# Patient Record
Sex: FEMALE | Race: WHITE | NOT HISPANIC OR LATINO | Employment: FULL TIME | URBAN - METROPOLITAN AREA
[De-identification: names, ages, dates, MRNs, and addresses within clinical notes are randomized per-mention and may not be internally consistent; named-entity substitution may affect disease eponyms.]

---

## 2017-02-04 ENCOUNTER — OFFICE VISIT (OUTPATIENT)
Dept: URGENT CARE | Facility: URGENT CARE | Age: 47
End: 2017-02-04
Payer: OTHER GOVERNMENT

## 2017-02-04 VITALS
DIASTOLIC BLOOD PRESSURE: 72 MMHG | BODY MASS INDEX: 23 KG/M2 | SYSTOLIC BLOOD PRESSURE: 113 MMHG | HEIGHT: 62 IN | TEMPERATURE: 97.6 F | WEIGHT: 125 LBS | HEART RATE: 91 BPM | OXYGEN SATURATION: 99 %

## 2017-02-04 DIAGNOSIS — R07.0 THROAT PAIN: Primary | ICD-10-CM

## 2017-02-04 DIAGNOSIS — M79.10 MUSCULAR ACHES: ICD-10-CM

## 2017-02-04 LAB
DEPRECATED S PYO AG THROAT QL EIA: NORMAL
FLUAV+FLUBV AG SPEC QL: NORMAL
FLUAV+FLUBV AG SPEC QL: NORMAL
MICRO REPORT STATUS: NORMAL
SPECIMEN SOURCE: NORMAL
SPECIMEN SOURCE: NORMAL

## 2017-02-04 PROCEDURE — 99213 OFFICE O/P EST LOW 20 MIN: CPT | Performed by: PHYSICIAN ASSISTANT

## 2017-02-04 PROCEDURE — 87880 STREP A ASSAY W/OPTIC: CPT | Performed by: FAMILY MEDICINE

## 2017-02-04 PROCEDURE — 87804 INFLUENZA ASSAY W/OPTIC: CPT | Performed by: PHYSICIAN ASSISTANT

## 2017-02-04 PROCEDURE — 87081 CULTURE SCREEN ONLY: CPT | Performed by: FAMILY MEDICINE

## 2017-02-04 NOTE — NURSING NOTE
"Chief Complaint   Patient presents with     Urgent Care     Pharyngitis     c/o sore throat for 2 days       Initial /72 mmHg  Pulse 91  Temp(Src) 97.6  F (36.4  C) (Tympanic)  Ht 5' 2\" (1.575 m)  Wt 125 lb (56.7 kg)  BMI 22.86 kg/m2  SpO2 99%  LMP 01/21/2017  Breastfeeding? No Estimated body mass index is 22.86 kg/(m^2) as calculated from the following:    Height as of this encounter: 5' 2\" (1.575 m).    Weight as of this encounter: 125 lb (56.7 kg).  Medication Reconciliation: complete   Adela Chavarria MA    "

## 2017-02-06 LAB
BACTERIA SPEC CULT: NORMAL
MICRO REPORT STATUS: NORMAL
SPECIMEN SOURCE: NORMAL

## 2017-06-30 ENCOUNTER — MYC REFILL (OUTPATIENT)
Dept: OBGYN | Facility: CLINIC | Age: 47
End: 2017-06-30

## 2017-06-30 DIAGNOSIS — Z01.419 ENCOUNTER FOR GYNECOLOGICAL EXAMINATION WITHOUT ABNORMAL FINDING: ICD-10-CM

## 2017-06-30 RX ORDER — NORGESTIMATE AND ETHINYL ESTRADIOL 0.25-0.035
1 KIT ORAL DAILY
Qty: 112 TABLET | Refills: 0 | Status: SHIPPED | OUTPATIENT
Start: 2017-06-30 | End: 2017-08-08

## 2017-06-30 NOTE — TELEPHONE ENCOUNTER
Pt sent refill request via [facility]. AE is scheduled for 08/08/2017. Refill sent per protocol. Lorrie Casas RN

## 2017-08-08 ENCOUNTER — OFFICE VISIT (OUTPATIENT)
Dept: OBGYN | Facility: CLINIC | Age: 47
End: 2017-08-08
Payer: OTHER GOVERNMENT

## 2017-08-08 VITALS
HEIGHT: 62 IN | TEMPERATURE: 98.3 F | BODY MASS INDEX: 23.55 KG/M2 | DIASTOLIC BLOOD PRESSURE: 60 MMHG | WEIGHT: 128 LBS | SYSTOLIC BLOOD PRESSURE: 102 MMHG

## 2017-08-08 DIAGNOSIS — Z13.29 SCREENING FOR THYROID DISORDER: ICD-10-CM

## 2017-08-08 DIAGNOSIS — Z01.419 ENCOUNTER FOR GYNECOLOGICAL EXAMINATION WITHOUT ABNORMAL FINDING: Primary | ICD-10-CM

## 2017-08-08 LAB — TSH SERPL DL<=0.005 MIU/L-ACNC: 2.64 MU/L (ref 0.4–4)

## 2017-08-08 PROCEDURE — 99396 PREV VISIT EST AGE 40-64: CPT | Performed by: OBSTETRICS & GYNECOLOGY

## 2017-08-08 PROCEDURE — 36415 COLL VENOUS BLD VENIPUNCTURE: CPT | Performed by: OBSTETRICS & GYNECOLOGY

## 2017-08-08 PROCEDURE — 84443 ASSAY THYROID STIM HORMONE: CPT | Performed by: OBSTETRICS & GYNECOLOGY

## 2017-08-08 RX ORDER — NORGESTIMATE AND ETHINYL ESTRADIOL 0.25-0.035
1 KIT ORAL DAILY
Qty: 112 TABLET | Refills: 3 | Status: SHIPPED | OUTPATIENT
Start: 2017-08-08 | End: 2018-08-09

## 2017-08-08 NOTE — NURSING NOTE
"Chief Complaint   Patient presents with     Physical       Initial /60  Temp 98.3  F (36.8  C) (Oral)  Ht 5' 2\" (1.575 m)  Wt 128 lb (58.1 kg)  LMP 2017  BMI 23.41 kg/m2 Estimated body mass index is 23.41 kg/(m^2) as calculated from the following:    Height as of this encounter: 5' 2\" (1.575 m).    Weight as of this encounter: 128 lb (58.1 kg).  BP completed using cuff size: regular        The following HM Due: NONE      The following patient reported/Care Every where data was sent to:  P ABSTRACT QUALITY INITIATIVES [57894]  NONE     patient has appointment for today             "

## 2017-08-08 NOTE — MR AVS SNAPSHOT
"              After Visit Summary   8/8/2017    Maria Teresa Benedict    MRN: [id number]           Patient Information     Date Of Birth          1970        Visit Information        Provider Department      8/8/2017 9:45 AM Laura Philippe MD [facility]        Today's Diagnoses     Encounter for gynecological examination without abnormal finding    -  1    Screening for thyroid disorder           Follow-ups after your visit        Your next 10 appointments already scheduled     Aug 09, 2017  9:45 AM CDT   MA SCREENING DIGITAL BILATERAL with OXMA1   [facility] ([facility]    [address] [address] [phone]           Do not use any powder, lotion or deodorant under your arms or on your breast. If you do, we will ask you to remove it before your exam.  Wear comfortable, two-piece clothing.  If you have any allergies, tell your care team.  Bring any previous mammograms from other facilities or have them mailed to the breast center. Three-dimensional (3D) mammograms are available at [facility] locations in [facility], and [place]. [facility] locations include [place] [facility] in [place].   Benefits of 3D mammograms include: - Improved rate of cancer detection - Decreases your chance of having to go back for more tests, which means fewer: - \"False-positive\" results (This means that there is an abnormal area but it isn't cancer.) - Invasive testing procedures, such as a biopsy or surgery - Can provide clearer images of the breast if you have dense breast tissue. 3D mammography is an optional exam that anyone can have with a 2D mammogram. It doesn't replace or take the place of a 2D mammogram. 2D mammograms remain an effective screening test for all women.  Not all insurance companies cover the cost of a 3D mammogram. Check with your insurance. " "             Who to contact     If you have questions or need follow up information about today's clinic visit or your schedule please contact [facility] directly at [phone].  Normal or non-critical lab and imaging results will be communicated to you by MyChart, letter or phone within 4 business days after the clinic has received the results. If you do not hear from us within 7 days, please contact the clinic through MyChart or phone. If you have a critical or abnormal lab result, we will notify you by phone as soon as possible.  Submit refill requests through [employer] or call your pharmacy and they will forward the refill request to us. Please allow 3 business days for your refill to be completed.          Additional Information About Your Visit        [employer] Information     [employer] gives you secure access to your electronic health record. If you see a primary care provider, you can also send messages to your care team and make appointments. If you have questions, please call your primary care clinic.  If you do not have a primary care provider, please call [phone] and they will assist you.        Care EveryWhere ID     This is your Care EveryWhere ID. This could be used by other organizations to access your [place] medical records  [id number]        Your Vitals Were     Temperature Height Last Period BMI (Body Mass Index)          98.3  F (36.8  C) (Oral) 5' 2\" (1.575 m) 07/30/2017 23.41 kg/m2         Blood Pressure from Last 3 Encounters:   08/08/17 102/60   02/04/17 113/72   07/13/16 98/62    Weight from Last 3 Encounters:   08/08/17 128 lb (58.1 kg)   02/04/17 125 lb (56.7 kg)   07/13/16 127 lb 3.2 oz (57.7 kg)              We Performed the Following     TSH with free T4 reflex          Where to get your medicines      These medications were [facility] to [id number] - [facility] [address]  [address] [facility] [address]     Phone:  [phone]     " norgestimate-ethinyl estradiol 0.25-35 MG-MCG per tablet          Primary Care Provider Office Phone # Fax #    RAMON Ramirez [phone]       [facility] [address] [place] [address]        Equal Access to Services     JAJA CARRILLO : Hadii aad ku hadasho Soomaali, waaxda luqadaha, qaybta kaalmada adeegyada, waxay idiin hayaan adeeg kharash laanya . So [facility] [phone].    ATENCIÓN: Si habla español, tiene a maya disposición servicios gratuitos de asistencia lingüística. Chaka al [phone].    We comply with applicable federal civil rights laws and [place] laws. We do not discriminate on the basis of race, color, national origin, age, disability sex, sexual orientation or gender identity.            Thank you!     Thank you for choosing [facility]  for your care. Our goal is always to provide you with excellent care. Hearing back from our patients is one way we can continue to improve our services. Please take a few minutes to complete the written survey that you may receive in the mail after your visit with us. Thank you!             Your Updated Medication List - Protect others around you: Learn how to safely use, store and throw away your medicines at www.disposemymeds.org.          This list is accurate as of: 8/8/17 10:22 AM.  Always use your most recent med list.                   Brand Name Dispense Instructions for use Diagnosis    MULTIVITAMIN ADULT PO           norgestimate-ethinyl estradiol 0.25-35 MG-MCG per tablet    ORTHO-CYCLEN, SPRINTEC    112 tablet    Take 1 tablet by mouth daily Active tablets daily for prevention of menses    Encounter for gynecological examination without abnormal finding

## 2017-08-08 NOTE — PROGRESS NOTES
Maria Teresa is a 46 year old  female who presents for annual exam.     Menses are regular q 28-30 days and normal lasting 4 days.  Menses flow: normal.  Patient's last menstrual period was 2017.. Using tubal ligation for contraception.  She is not currently considering pregnancy.  Besides routine health maintenance, she has no other health concerns today .  On OCP for cycle control. Still taking monthly withdrawals right now as prefers that to BTB--but still loves the pill.  Sons are now 11 and 7 y/o. Doing well.  Oldest going into 6th grade this year so will need to find a middle school for next year.  GYNECOLOGIC HISTORY:  Menarche:   Maria Teresa is sexually active with 1male partner(s) and is currently in monogamous relationship.    History sexually transmitted infections:No STD history  STI testing offered?  Declined  LAURA exposure: Unknown  History of abnormal Pap smear: NO - age 30- 65 PAP every 3 years recommended  Family history of breast CA: No  Family history of uterine/ovarian CA: No    Family history of colon CA: No    HEALTH MAINTENANCE:  Cholesterol: (  Cholesterol   Date Value Ref Range Status   2015 167 <200 mg/dL Final     Comment:     LDL Cholesterol is the primary guide to therapy.   The NCEP recommends further evaluation of: patients with cholesterol greater   than 200 mg/dL if additional risk factors are present, cholesterol greater   than   240 mg/dL, triglycerides greater than 150 mg/dL, or HDL less than 40 mg/dL.     2012 179 0 - 200 mg/dL Final     Comment:     LDL Cholesterol is the primary guide to therapy.   The NCEP recommends further evaluation of: patients with cholesterol greater   than 200 mg/dL if additional risk factors are present, cholesterol greater   than   240 mg/dL, triglycerides greater than 150 mg/dL, or HDL less than 40 mg/dL.    History of abnormal lipids: No  Mammo: 2016 . History of abnormal Mammo: No.  Regular Self Breast Exams: Yes  Calcium/Vitamin D  intake: source:  dairy, dietary supplement(s) Adequate? Yes  TSH: (  TSH   Date Value Ref Range Status   2015 3.48 0.40 - 4.00 mU/L Final    )  Pap; (  Lab Results   Component Value Date    PAP NIL 07/10/2015    PAP NIL 2012    PAP NIL 2009    )    HISTORY:  Obstetric History       T2      L2     SAB0   TAB0   Ectopic0   Multiple0   Live Births2       # Outcome Date GA Lbr Tu/2nd Weight Sex Delivery Anes PTL Lv   2 Term 09 38w3d  7 lb 14 oz (3.572 kg) M CS-Unspec   WADE      Name: Darin (Alekbie)   1 Term 06 39w3d  8 lb (3.629 kg) M CS   WADE      Name: Deniz Chacon        Past Medical History:   Diagnosis Date     LSIL (low grade squamous intraepithelial lesion) on Pap smear     colp- focal koilocytosis     NO ACTIVE PROBLEMS      Shingles 2015     Past Surgical History:   Procedure Laterality Date     C/SECTION, LOW TRANSVERSE  ,     , Low Transverse     HC TOOTH EXTRACTION W/FORCEP      wisdom teeth     TUBAL LIGATION      PPTL     Family History   Problem Relation Age of Onset     Circulatory Mother      blood clots after SAB     GASTROINTESTINAL DISEASE Mother      polyps removed     Hypertension Father      Hypertension Paternal Grandmother      DIABETES Paternal Grandmother      Arthritis Maternal Grandmother      OSTEOPOROSIS Maternal Grandmother      Hypertension Maternal Grandfather      Eye Disorder Maternal Grandfather      glacoma     HEART DISEASE Maternal Grandfather      MI     CANCER Paternal Grandfather      melanoma     Blood Disease Paternal Grandfather      hep     Respiratory Brother      asthma     Social History     Social History     Marital status:      Spouse name: Oni Benedict     Number of children: 2     Years of education: 19     Occupational History     assistant prof      geography      [employer]     Social History Main Topics     Smoking status: Never Smoker     Smokeless tobacco: Never Used      "Alcohol use 0.0 oz/week     0 Standard drinks or equivalent per week      Comment: occ     Drug use: No     Sexual activity: Yes     Partners: Male     Birth control/ protection: Surgical      Comment: tubal ligation     Other Topics Concern     Not on file     Social History Narrative            Current Outpatient Prescriptions:      Multiple Vitamins-Minerals (MULTIVITAMIN ADULT PO), , Disp: , Rfl:      norgestimate-ethinyl estradiol (ORTHO-CYCLEN, SPRINTEC) 0.25-35 MG-MCG per tablet, Take 1 tablet by mouth daily Active tablets daily for prevention of menses, Disp: 112 tablet, Rfl: 0     Allergies   Allergen Reactions     Penicillins Hives       Past medical, surgical, social and family history were [facility] and [facility].    EXAM:  /60  Temp 98.3  F (36.8  C) (Oral)  Ht 5' 2\" (1.575 m)  Wt 128 lb (58.1 kg)  LMP 07/30/2017  BMI 23.41 kg/m2   BMI: Body mass index is 23.41 kg/(m^2).  Constitutional: healthy, alert and no distress  Head: Normocephalic. No masses, lesions, tenderness or abnormalities  Neck: Neck supple. Trachea midline. No adenopathy. Thyroid symmetric, normal size.   Cardiovascular: RRR.   Respiratory: Negative.   Breasts reveal mild symmetric fibrocystic densities, but there are no dominant, discrete, fixed or suspicious masses found.    Gastrointestinal: Abdomen soft, non-tender, non-distended. No masses, organomegaly.  :  Vulva:  No external lesions, normal female hair distribution, no inguinal adenopathy.    Urethra:  Midline, non-tender, well supported, no discharge  Vagina:  Moist, pink, no abnormal discharge, no lesions  Uterus:  Normal size , non-tender, freely mobile  Ovaries:  No masses appreciated, non-tender, mobile  Rectal Exam: deferred  Musculoskeletal: extremities normal  Skin: no suspicious lesions or rashes  Psychiatric: Affect appropriate, cooperative,mentation appears normal.     COUNSELING:   Reviewed preventive health counseling, as reflected in patient " instructions   reports that she has never smoked. She has never used smokeless tobacco.    Body mass index is 23.41 kg/(m^2).      FRAX Risk Assessment    ASSESSMENT:  46 year old female with satisfactory annual exam  (Z01.419) Encounter for gynecological examination without abnormal finding  (primary encounter diagnosis)  Comment: tubal  Plan: norgestimate-ethinyl estradiol (ORTHO-CYCLEN,         SPRINTEC) 0.25-35 MG-MCG per tablet        Refill of OCP done and discussed can try q6-9 wk withdrawal bleeds if desires.    Plan fasting labs next year--done 2015 and normal.    (Z13.29) Screening for thyroid disorder  Comment: borderline last year  Plan: TSH with free T4 reflex        Check this year.  Mother has hypothyroid.      Laura Philippe MD

## 2017-08-09 ENCOUNTER — RADIANT APPOINTMENT (OUTPATIENT)
Dept: MAMMOGRAPHY | Facility: CLINIC | Age: 47
End: 2017-08-09
Attending: OBSTETRICS & GYNECOLOGY
Payer: OTHER GOVERNMENT

## 2017-08-09 DIAGNOSIS — Z12.31 VISIT FOR SCREENING MAMMOGRAM: ICD-10-CM

## 2017-08-09 PROCEDURE — G0202 SCR MAMMO BI INCL CAD: HCPCS | Mod: TC

## 2017-10-09 ENCOUNTER — ALLIED HEALTH/NURSE VISIT (OUTPATIENT)
Dept: NURSING | Facility: CLINIC | Age: 47
End: 2017-10-09
Payer: OTHER GOVERNMENT

## 2017-10-09 DIAGNOSIS — Z23 NEED FOR PROPHYLACTIC VACCINATION AND INOCULATION AGAINST INFLUENZA: Primary | ICD-10-CM

## 2017-10-09 PROCEDURE — 90471 IMMUNIZATION ADMIN: CPT

## 2017-10-09 PROCEDURE — 90686 IIV4 VACC NO PRSV 0.5 ML IM: CPT

## 2017-10-09 PROCEDURE — 99207 ZZC NO CHARGE NURSE ONLY: CPT

## 2017-10-09 NOTE — MR AVS SNAPSHOT
After Visit Summary   10/9/2017    Maria Teresa Benedict    MRN: [id number]           Patient Information     Date Of Birth          1970        Visit Information        Provider Department      10/9/2017 4:00 PM [facility]IC NURSE [facility]        Today's Diagnoses     Need for prophylactic vaccination and inoculation against influenza    -  1       Follow-ups after your visit        Who to contact     If you have questions or need follow up information about today's clinic visit or your schedule please contact [facility] directly at [phone].  Normal or non-critical lab and imaging results will be communicated to you by "[employer]hart, letter or phone within 4 business days after the clinic has received the results. If you do not hear from us within 7 days, please contact the clinic through [employer] or phone. If you have a critical or abnormal lab result, we will notify you by phone as soon as possible.  Submit refill requests through [employer] or call your pharmacy and they will forward the refill request to us. Please allow 3 business days for your refill to be completed.          Additional Information About Your Visit        MyChart Information     [employer] gives you secure access to your electronic health record. If you see a primary care provider, you can also send messages to your care team and make appointments. If you have questions, please call your primary care clinic.  If you do not have a primary care provider, please call [phone] and they will assist you.        Care EveryWhere ID     This is your Care EveryWhere ID. This could be used by other organizations to access your [place] medical records  [id number]         Blood Pressure from Last 3 Encounters:   08/08/17 102/60   02/04/17 113/72   07/13/16 98/62    Weight from Last 3 Encounters:   08/08/17 128 lb (58.1 kg)   02/04/17 125 lb (56.7 kg)   07/13/16 127 lb 3.2 oz (57.7 kg)               We Performed the Following     FLU VAC, SPLIT VIRUS IM > 3 YO (QUADRIVALENT) [62503]     Vaccine Administration, Initial [11801]        Primary Care Provider Office Phone # Fax #    Yanci WebbrupertonbRAMON barker [phone] [address] [place] [address]        Equal Access to Services     [facility] Center[facility]IGN[facility] : Hadii aad ku hadasho Soomaali, waaxda luqadaha, qaybta kaalmada adeegyada, waxay idiin hayaan adeeg kharash laanya . So [facility] [phone].    ATENCIÓN: Si habla español, tiene a maya disposición servicios gratuitos de asistencia lingüística. Chaka al [phone].    We comply with applicable federal civil rights laws and [place] laws. We do not discriminate on the basis of race, color, national origin, age, disability, sex, sexual orientation, or gender identity.            Thank you!     Thank you for choosing [facility]  for your care. Our goal is always to provide you with excellent care. Hearing back from our patients is one way we can continue to improve our services. Please take a few minutes to complete the written survey that you may receive in the mail after your visit with us. Thank you!             Your Updated Medication List - Protect others around you: Learn how to safely use, store and throw away your medicines at www.disposemymeds.org.          This list is accurate as of: 10/9/17  4:27 PM.  Always use your most recent med list.                   Brand Name Dispense Instructions for use Diagnosis    MULTIVITAMIN ADULT PO           norgestimate-ethinyl estradiol 0.25-35 MG-MCG per tablet    ORTHO-CYCLEN, SPRINTEC    112 tablet    Take 1 tablet by mouth daily Active tablets daily for prevention of menses    Encounter for gynecological examination without abnormal finding

## 2017-10-09 NOTE — PROGRESS NOTES
Injectable Influenza Immunization Documentation    1.  Is the person to be vaccinated sick today?   No    2. Does the person to be vaccinated have an allergy to a component   of the vaccine?   No    3. Has the person to be vaccinated ever had a serious reaction   to influenza vaccine in the past?   No    4. Has the person to be vaccinated ever had Guillain-Barré syndrome?   No    Form completed by Nikki Patel MA

## 2018-08-08 DIAGNOSIS — Z12.31 VISIT FOR SCREENING MAMMOGRAM: ICD-10-CM

## 2018-08-08 PROCEDURE — 77067 SCR MAMMO BI INCL CAD: CPT | Mod: TC

## 2018-08-09 ENCOUNTER — OFFICE VISIT (OUTPATIENT)
Dept: OBGYN | Facility: CLINIC | Age: 48
End: 2018-08-09
Payer: OTHER GOVERNMENT

## 2018-08-09 VITALS
OXYGEN SATURATION: 99 % | HEART RATE: 84 BPM | SYSTOLIC BLOOD PRESSURE: 105 MMHG | HEIGHT: 62 IN | WEIGHT: 125.7 LBS | DIASTOLIC BLOOD PRESSURE: 69 MMHG | BODY MASS INDEX: 23.13 KG/M2

## 2018-08-09 DIAGNOSIS — Z13.1 SCREENING FOR DIABETES MELLITUS: ICD-10-CM

## 2018-08-09 DIAGNOSIS — Z13.0 SCREENING FOR IRON DEFICIENCY ANEMIA: ICD-10-CM

## 2018-08-09 DIAGNOSIS — Z13.29 SCREENING FOR THYROID DISORDER: ICD-10-CM

## 2018-08-09 DIAGNOSIS — Z01.419 ENCOUNTER FOR GYNECOLOGICAL EXAMINATION WITHOUT ABNORMAL FINDING: Primary | ICD-10-CM

## 2018-08-09 DIAGNOSIS — Z13.220 SCREENING FOR LIPOID DISORDERS: ICD-10-CM

## 2018-08-09 LAB
ERYTHROCYTE [DISTWIDTH] IN BLOOD BY AUTOMATED COUNT: 13.3 % (ref 10–15)
HCT VFR BLD AUTO: 40.4 % (ref 35–47)
HGB BLD-MCNC: 13.2 G/DL (ref 11.7–15.7)
MCH RBC QN AUTO: 31.4 PG (ref 26.5–33)
MCHC RBC AUTO-ENTMCNC: 32.7 G/DL (ref 31.5–36.5)
MCV RBC AUTO: 96 FL (ref 78–100)
PLATELET # BLD AUTO: 285 10E9/L (ref 150–450)
RBC # BLD AUTO: 4.21 10E12/L (ref 3.8–5.2)
WBC # BLD AUTO: 7.5 10E9/L (ref 4–11)

## 2018-08-09 PROCEDURE — 80061 LIPID PANEL: CPT | Performed by: OBSTETRICS & GYNECOLOGY

## 2018-08-09 PROCEDURE — 84443 ASSAY THYROID STIM HORMONE: CPT | Performed by: OBSTETRICS & GYNECOLOGY

## 2018-08-09 PROCEDURE — 36415 COLL VENOUS BLD VENIPUNCTURE: CPT | Performed by: OBSTETRICS & GYNECOLOGY

## 2018-08-09 PROCEDURE — 85027 COMPLETE CBC AUTOMATED: CPT | Performed by: OBSTETRICS & GYNECOLOGY

## 2018-08-09 PROCEDURE — 80053 COMPREHEN METABOLIC PANEL: CPT | Performed by: OBSTETRICS & GYNECOLOGY

## 2018-08-09 PROCEDURE — 99396 PREV VISIT EST AGE 40-64: CPT | Performed by: OBSTETRICS & GYNECOLOGY

## 2018-08-09 RX ORDER — NORGESTIMATE AND ETHINYL ESTRADIOL 0.25-0.035
1 KIT ORAL DAILY
Qty: 112 TABLET | Refills: 3 | Status: SHIPPED | OUTPATIENT
Start: 2018-08-09 | End: 2019-08-21

## 2018-08-09 NOTE — PROGRESS NOTES
Maria Teresa is a 47 year old  female who presents for annual exam.     Menses are regular q 28-30 days and normal lasting 4 days.  Menses flow: normal.  Patient's last menstrual period was 2018.. Using tubal ligation and oral contraceptives for contraception.  She is not currently considering pregnancy.  Besides routine health maintenance, she has no other health concerns today . Boys are 12 and 9 now. Middle school at [employer].  Both kids will have different days off of school in spring breaks.    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  Feels much better but a little nervous.  Taking OCP for cycle regularity and loves it. Doesn't want to do continuous therapy.   GYNECOLOGIC HISTORY:  Menarche  Maria Teresa is sexually active with 1male partner(s) and is currently in monogamous relationship .    History sexually transmitted infections:No STD history  STI testing offered?  Declined  LAURA exposure: Unknown  History of abnormal Pap smear: NO - age 30- 65 PAP every 3 years recommended  Family history of breast CA: No  Family history of uterine/ovarian CA: No    Family history of colon CA: No    HEALTH MAINTENANCE:  Cholesterol: (  Cholesterol   Date Value Ref Range Status   2015 167 <200 mg/dL Final     Comment:     LDL Cholesterol is the primary guide to therapy.   The NCEP recommends further evaluation of: patients with cholesterol greater   than 200 mg/dL if additional risk factors are present, cholesterol greater   than   240 mg/dL, triglycerides greater than 150 mg/dL, or HDL less than 40 mg/dL.     2012 179 0 - 200 mg/dL Final     Comment:     LDL Cholesterol is the primary guide to therapy.   The NCEP recommends further evaluation of: patients with cholesterol greater   than 200 mg/dL if additional risk factors are present, cholesterol greater   than   240 mg/dL, triglycerides greater than 150 mg/dL, or HDL less than 40 mg/dL.    History  of abnormal lipids: No  Mammo: 20 . History of abnormal Mammo: No.  Regular Self Breast Exams: Yes  Calcium/Vitamin D intake: source:  dairy, multivat Adequate? Yes  TSH: (  TSH   Date Value Ref Range Status   2017 2.64 0.40 - 4.00 mU/L Final    )  Pap; (  Lab Results   Component Value Date    PAP NIL 07/10/2015    PAP NIL 2012    PAP NIL 2009    )    HISTORY:  Obstetric History       T2      L2     SAB0   TAB0   Ectopic0   Multiple0   Live Births2       # Outcome Date GA Lbr Tu/2nd Weight Sex Delivery Anes PTL Lv   2 Term 09 38w3d  7 lb 14 oz (3.572 kg) M CS-Unspec   WADE      Name: Darin (Srinivas)   1 Term 06 39w3d  8 lb (3.629 kg) M CS   WADE      Name: Deniz Chacon        Past Medical History:   Diagnosis Date     LSIL (low grade squamous intraepithelial lesion) on Pap smear     colp- focal koilocytosis     NO ACTIVE PROBLEMS      Shingles 2015     Past Surgical History:   Procedure Laterality Date     C/SECTION, LOW TRANSVERSE  ,     , Low Transverse     HC TOOTH EXTRACTION W/FORCEP      wisdom teeth     TUBAL LIGATION      PPTL     Family History   Problem Relation Age of Onset     Circulatory Mother      blood clots after SAB     GASTROINTESTINAL DISEASE Mother      polyps removed     Thyroid Disease Mother      hypothyroid     Hypertension Mother      Hypertension Father      Hypertension Paternal Grandmother      Diabetes Paternal Grandmother      Arthritis Maternal Grandmother      Osteoperosis Maternal Grandmother      Hypertension Maternal Grandfather      Eye Disorder Maternal Grandfather      glacoma     HEART DISEASE Maternal Grandfather      MI     Cancer Paternal Grandfather      melanoma     Blood Disease Paternal Grandfather      hep     Respiratory Brother      asthma     Social History     Social History     Marital status:      Spouse name: Oni Benedict     Number of children: 2     Years of education: 19  "    Occupational History     assistant       geography      [employer]     Social History Main Topics     Smoking status: Never Smoker     Smokeless tobacco: Never Used     Alcohol use 0.0 oz/week     0 Standard drinks or equivalent per week      Comment: occ     Drug use: No     Sexual activity: Yes     Partners: Male     Birth control/ protection: Surgical      Comment: tubal ligation     Other Topics Concern     Not on file     Social History Narrative            Current Outpatient Prescriptions:      Multiple Vitamins-Minerals (MULTIVITAMIN ADULT PO), , Disp: , Rfl:      norgestimate-ethinyl estradiol (ORTHO-CYCLEN, SPRINTEC) 0.25-35 MG-MCG per tablet, Take 1 tablet by mouth daily Active tablets daily for prevention of menses, Disp: 112 tablet, Rfl: 3     Allergies   Allergen Reactions     Penicillins Hives       Past medical, surgical, social and family history were reviewed and updated in EPIC.    EXAM:  /69  Pulse 84  Ht 5' 2\" (1.575 m)  Wt 125 lb 11.2 oz (57 kg)  LMP 07/29/2018  SpO2 99%  BMI 22.99 kg/m2   BMI: Body mass index is 22.99 kg/(m^2).  Constitutional: healthy, alert and no distress  Head: Normocephalic. No masses, lesions, tenderness or abnormalities  Neck: Neck supple. Trachea midline. No adenopathy. Thyroid symmetric, normal size.   Cardiovascular: RRR.   Respiratory: Negative.   Breast: No nodularity, asymmetry or nipple discharge bilaterally.  Gastrointestinal: Abdomen soft, non-tender, non-distended. No masses, organomegaly.  :  Vulva:  No external lesions, normal female hair distribution, no inguinal adenopathy.    Urethra:  Midline, non-tender, well supported, no discharge  Vagina:  Moist, pink, no abnormal discharge, no lesions  Uterus:  Normal size , non-tender, freely mobile  Ovaries:  No masses appreciated, non-tender, mobile  Rectal Exam: deferred  Musculoskeletal: extremities normal  Skin: no suspicious lesions or rashes  Psychiatric: Affect appropriate, " cooperative,mentation appears normal.     COUNSELING:   Reviewed preventive health counseling, as reflected in patient instructions   reports that she has never smoked. She has never used smokeless tobacco.    Body mass index is 22.99 kg/(m^2).    FRAX Risk Assessment    ASSESSMENT:  47 year old female with satisfactory annual exam  (Z01.419) Encounter for gynecological examination without abnormal finding  (primary encounter diagnosis)  Comment: tubal ligation  Plan: norgestimate-ethinyl estradiol (ORTHO-CYCLEN,         SPRINTEC) 0.25-35 MG-MCG per tablet        Using OCP for cycle control and refill was done.  Prefers monthly withdrawal bleeds.    Discussed mother with colon polyps and new colonoscopy recommendations to drop age to 45.  This is not yet covered by insurance and she has no symptoms so will recheck next year to see if covered.    (Z13.220) Screening for lipoid disorders  Comment: Fasting today  Plan: Lipid Profile        Check lab    (Z13.29) Screening for thyroid disorder  Comment: Borderline the last few years  Plan: TSH with free T4 reflex        Check lab    (Z13.1) Screening for diabetes mellitus  Comment: Fasting today  Plan: Comprehensive metabolic panel        Check lab    (Z13.0) Screening for iron deficiency anemia  Plan: CBC with platelets        Check lab.        Laura Philippe MD

## 2018-08-09 NOTE — MR AVS SNAPSHOT
After Visit Summary   8/9/2018    Maria Teresa Benedict    MRN: [id number]           Patient Information     Date Of Birth          1970        Visit Information        Provider Department      8/9/2018 12:30 PM Laura Philippe MD [facility]        Today's Diagnoses     Encounter for gynecological examination without abnormal finding    -  1    Screening for lipoid disorders        Screening for thyroid disorder        Screening for diabetes mellitus        Screening for iron deficiency anemia           Follow-ups after your visit        Who to contact     If you have questions or need follow up information about today's clinic visit or your schedule please contact [facility] directly at [phone].  Normal or non-critical lab and imaging results will be communicated to you by MyChart, letter or phone within 4 business days after the clinic has received the results. If you do not hear from us within 7 days, please contact the clinic through Mossohart or phone. If you have a critical or abnormal lab result, we will notify you by phone as soon as possible.  Submit refill requests through ESILLAGE or call your pharmacy and they will forward the refill request to us. Please allow 3 business days for your refill to be completed.          Additional Information About Your Visit        MyChart Information     ESILLAGE gives you secure access to your electronic health record. If you see a primary care provider, you can also send messages to your care team and make appointments. If you have questions, please call your primary care clinic.  If you do not have a primary care provider, please call [phone] and they will assist you.        Care EveryWhere ID     This is your Care EveryWhere ID. This could be used by other organizations to access your [facility] medical records  [id number]        Your Vitals Were     Pulse Height Last Period Pulse Oximetry BMI (Body  "Mass Index)       84 5' 2\" (1.575 m) 07/29/2018 99% 22.99 kg/m2        Blood Pressure from Last 3 Encounters:   08/09/18 105/69   08/08/17 102/60   02/04/17 113/72    Weight from Last 3 Encounters:   08/09/18 125 lb 11.2 oz (57 kg)   08/08/17 128 lb (58.1 kg)   02/04/17 125 lb (56.7 kg)              We Performed the Following     CBC with platelets     Comprehensive metabolic panel     Lipid Profile     TSH with free T4 reflex          Where to get your medicines      These medications were sent to [id number] - [place], [place] - [address]  [address] [facility] [address]     Phone:  [phone]     norgestimate-ethinyl estradiol 0.25-35 MG-MCG per tablet          Primary Care Provider Office Phone # Fax #    Yanci RAMON Beth [facility] [phone] [address] [place] [address]        Equal Access to Services     [facility] : Hadii laurie ku hadasho Soomaali, waaxda luqadaha, qaybta kaalmada adecristina, sukhi barriga . So [facility] [phone].    ATENCIÓN: Si habla español, tiene a maya disposición servicios gratuitos de asistencia lingüística. [facility] [phone].    We comply with applicable federal civil rights laws and [place] laws. We do not discriminate on the basis of race, color, national origin, age, disability, sex, sexual orientation, or gender identity.            Thank you!     Thank you for choosing [facility]  for your care. Our goal is always to provide you with excellent care. Hearing back from our patients is one way we can continue to improve our services. Please take a few minutes to complete the written survey that you may receive in the mail after your visit with us. Thank you!             Your Updated Medication List - Protect others around you: Learn how to safely use, store and throw away your medicines at www.disposemymeds.org.          This list is accurate as of 8/9/18 12:58 PM.  Always " use your most recent med list.                   Brand Name Dispense Instructions for use Diagnosis    MULTIVITAMIN ADULT PO           norgestimate-ethinyl estradiol 0.25-35 MG-MCG per tablet    ORTHO-CYCLEN, SPRINTEC    112 tablet    Take 1 tablet by mouth daily Active tablets daily for prevention of menses    Encounter for gynecological examination without abnormal finding

## 2018-08-10 LAB
ALBUMIN SERPL-MCNC: 3.8 G/DL (ref 3.4–5)
ALP SERPL-CCNC: 59 U/L (ref 40–150)
ALT SERPL W P-5'-P-CCNC: 16 U/L (ref 0–50)
ANION GAP SERPL CALCULATED.3IONS-SCNC: 11 MMOL/L (ref 3–14)
AST SERPL W P-5'-P-CCNC: 14 U/L (ref 0–45)
BILIRUB SERPL-MCNC: 0.4 MG/DL (ref 0.2–1.3)
BUN SERPL-MCNC: 15 MG/DL (ref 7–30)
CALCIUM SERPL-MCNC: 8.8 MG/DL (ref 8.5–10.1)
CHLORIDE SERPL-SCNC: 104 MMOL/L (ref 94–109)
CHOLEST SERPL-MCNC: 189 MG/DL
CO2 SERPL-SCNC: 25 MMOL/L (ref 20–32)
CREAT SERPL-MCNC: 0.8 MG/DL (ref 0.52–1.04)
GFR SERPL CREATININE-BSD FRML MDRD: 77 ML/MIN/1.7M2
GLUCOSE SERPL-MCNC: 84 MG/DL (ref 70–99)
HDLC SERPL-MCNC: 77 MG/DL
LDLC SERPL CALC-MCNC: 90 MG/DL
NONHDLC SERPL-MCNC: 112 MG/DL
POTASSIUM SERPL-SCNC: 4.5 MMOL/L (ref 3.4–5.3)
PROT SERPL-MCNC: 7.2 G/DL (ref 6.8–8.8)
SODIUM SERPL-SCNC: 140 MMOL/L (ref 133–144)
TRIGL SERPL-MCNC: 108 MG/DL
TSH SERPL DL<=0.005 MIU/L-ACNC: 1.8 MU/L (ref 0.4–4)

## 2018-10-10 ENCOUNTER — ALLIED HEALTH/NURSE VISIT (OUTPATIENT)
Dept: NURSING | Facility: CLINIC | Age: 48
End: 2018-10-10
Payer: OTHER GOVERNMENT

## 2018-10-10 DIAGNOSIS — Z23 NEED FOR PROPHYLACTIC VACCINATION AND INOCULATION AGAINST INFLUENZA: Primary | ICD-10-CM

## 2018-10-10 PROCEDURE — 90686 IIV4 VACC NO PRSV 0.5 ML IM: CPT

## 2018-10-10 PROCEDURE — 99207 ZZC NO CHARGE NURSE ONLY: CPT

## 2018-10-10 PROCEDURE — 90471 IMMUNIZATION ADMIN: CPT

## 2018-10-10 NOTE — MR AVS SNAPSHOT
After Visit Summary   10/10/2018    Maria Teresa Benedict    MRN: [id number]           Patient Information     Date Of Birth          1970        Visit Information        Provider Department      10/10/2018 4:30 PM [facility] NURSE [facility]        Today's Diagnoses     Need for prophylactic vaccination and inoculation against influenza    -  1       Follow-ups after your visit        Who to contact     If you have questions or need follow up information about today's clinic visit or your schedule please contact [facility] directly at [phone].  Normal or non-critical lab and imaging results will be communicated to you by Kutuanhart, letter or phone within 4 business days after the clinic has received the results. If you do not hear from us within 7 days, please contact the clinic through Playerize or phone. If you have a critical or abnormal lab result, we will notify you by phone as soon as possible.  Submit refill requests through Playerize or call your pharmacy and they will forward the refill request to us. Please allow 3 business days for your refill to be completed.          Additional Information About Your Visit        MyChart Information     Playerize gives you secure access to your electronic health record. If you see a primary care provider, you can also send messages to your care team and make appointments. If you have questions, please call your primary care clinic.  If you do not have a primary care provider, please call [phone] and they will assist you.        Care EveryWhere ID     This is your Care EveryWhere ID. This could be used by other organizations to access your [facility] medical records  [id number]         Blood Pressure from Last 3 Encounters:   08/09/18 105/69   08/08/17 102/60   02/04/17 113/72    Weight from Last 3 Encounters:   08/09/18 125 lb 11.2 oz (57 kg)   08/08/17 128 lb (58.1 kg)   02/04/17 125 lb (56.7 kg)               We Performed the Following     FLU VACCINE, SPLIT VIRUS, IM (QUADRIVALENT) [15158]- >3 YRS     Vaccine Administration, Initial [68244]        Primary Care Provider Office Phone # Fax #    Yanci TurnernbRAMON barker [phone] [address] [place] [address]        Equal Access to Services     [facility] : Hadii aad ku hadasho Soomaali, waaxda luqadaha, qaybta kaalmada adeegyada, waxay idiin hayaan adeeg kharash la'suzin . So [facility] [phone].    ATENCIÓN: Si habla español, tiene a maya disposición servicios gratuitos de asistencia lingüística. [facility] al [phone].    We comply with applicable federal civil rights laws and [place] laws. We do not discriminate on the basis of race, color, national origin, age, disability, sex, sexual orientation, or gender identity.            Thank you!     Thank you for choosing [facility]  for your care. Our goal is always to provide you with excellent care. Hearing back from our patients is one way we can continue to improve our services. Please take a few minutes to complete the written survey that you may receive in the mail after your visit with us. Thank you!             Your Updated Medication List - Protect others around you: Learn how to safely use, store and throw away your medicines at www.disposemymeds.org.          This list is accurate as of 10/10/18  5:45 PM.  Always use your most recent med list.                   Brand Name Dispense Instructions for use Diagnosis    MULTIVITAMIN ADULT PO           norgestimate-ethinyl estradiol 0.25-35 MG-MCG per tablet    ORTHO-CYCLEN, SPRINTEC    112 tablet    Take 1 tablet by mouth daily Active tablets daily for prevention of menses    Encounter for gynecological examination without abnormal finding

## 2018-10-10 NOTE — PROGRESS NOTES

## 2018-10-11 ENCOUNTER — TELEPHONE (OUTPATIENT)
Dept: FAMILY MEDICINE | Facility: CLINIC | Age: 48
End: 2018-10-11

## 2018-10-11 NOTE — TELEPHONE ENCOUNTER
"Patient has been having issues with \"Long flashing lights\" for the past 15 minutes and seems to be getting worse and more frequent.  Has not had in the past.  Not currently complaining of headache symptoms.  Is currently at work  Recommend ER eval.  Should not drive herself as the symptoms are worsening.  Latricia Smith RN    "

## 2019-08-07 DIAGNOSIS — Z12.31 VISIT FOR SCREENING MAMMOGRAM: ICD-10-CM

## 2019-08-07 PROCEDURE — 77063 BREAST TOMOSYNTHESIS BI: CPT | Mod: TC

## 2019-08-07 PROCEDURE — 77067 SCR MAMMO BI INCL CAD: CPT | Mod: TC

## 2019-08-21 ENCOUNTER — OFFICE VISIT (OUTPATIENT)
Dept: OBGYN | Facility: CLINIC | Age: 49
End: 2019-08-21
Payer: OTHER GOVERNMENT

## 2019-08-21 VITALS
WEIGHT: 130 LBS | DIASTOLIC BLOOD PRESSURE: 50 MMHG | BODY MASS INDEX: 23.92 KG/M2 | TEMPERATURE: 97.9 F | SYSTOLIC BLOOD PRESSURE: 94 MMHG | HEIGHT: 62 IN

## 2019-08-21 DIAGNOSIS — Z01.419 ENCOUNTER FOR GYNECOLOGICAL EXAMINATION WITHOUT ABNORMAL FINDING: Primary | ICD-10-CM

## 2019-08-21 PROCEDURE — 99396 PREV VISIT EST AGE 40-64: CPT | Performed by: OBSTETRICS & GYNECOLOGY

## 2019-08-21 RX ORDER — NORGESTIMATE AND ETHINYL ESTRADIOL 0.25-0.035
1 KIT ORAL DAILY
Qty: 112 TABLET | Refills: 4 | Status: SHIPPED | OUTPATIENT
Start: 2019-08-21 | End: 2020-08-31

## 2019-08-21 SDOH — HEALTH STABILITY: MENTAL HEALTH: HOW MANY STANDARD DRINKS CONTAINING ALCOHOL DO YOU HAVE ON A TYPICAL DAY?: 1 OR 2

## 2019-08-21 SDOH — HEALTH STABILITY: MENTAL HEALTH: HOW OFTEN DO YOU HAVE 6 OR MORE DRINKS ON ONE OCCASION?: NEVER

## 2019-08-21 SDOH — HEALTH STABILITY: MENTAL HEALTH: HOW OFTEN DO YOU HAVE A DRINK CONTAINING ALCOHOL?: 2-3 TIMES A WEEK

## 2019-08-21 ASSESSMENT — MIFFLIN-ST. JEOR: SCORE: 1182.93

## 2019-08-21 NOTE — PROGRESS NOTES
Maria Teresa is a 48 year old  female who presents for annual exam.     Menses are regular q 28-30 days and normal lasting 4 days.  Menses flow: normal.  Patient's last menstrual period was 2019.. Using oral contraceptives for contraception.  She is not currently considering pregnancy.  Besides routine health maintenance, she has no other health concerns today .  Boys are 13 and 10 now. Still with different spring breaks, one started school today (alessia)  and the other is after labor day.   GYNECOLOGIC HISTORY:  Menarche:   Maria Teresa is sexually active with 1male partner(s) and is currently in monogamous relationship.    History sexually transmitted infections:No STD history  STI testing offered?  Declined  LAURA exposure: Unknown  History of abnormal Pap smear: NO - age 30- 65 PAP every 3 years recommended  Family history of breast CA: No  Family history of uterine/ovarian CA: No    Family history of colon CA: No    HEALTH MAINTENANCE:  Cholesterol: (  Cholesterol   Date Value Ref Range Status   2018 189 <200 mg/dL Final   2015 167 <200 mg/dL Final     Comment:     LDL Cholesterol is the primary guide to therapy.   The NCEP recommends further evaluation of: patients with cholesterol greater   than 200 mg/dL if additional risk factors are present, cholesterol greater   than   240 mg/dL, triglycerides greater than 150 mg/dL, or HDL less than 40 mg/dL.      History of abnormal lipids: Yes  Mammo: 19 . History of abnormal Mammo: No.  Regular Self Breast Exams: Yes  Calcium/Vitamin D intake: source:  dairy, multivitamin Adequate? Yes  TSH: (  TSH   Date Value Ref Range Status   2018 1.80 0.40 - 4.00 mU/L Final    )  Pap; (  Lab Results   Component Value Date    PAP NIL 07/10/2015    PAP NIL 2012    PAP NIL 2009    )    HISTORY:  OB History    Para Term  AB Living   2 2 2 0 0 2   SAB TAB Ectopic Multiple Live Births   0 0 0 0 2      # Outcome Date GA Lbr Tu/2nd Weight Sex  Delivery Anes PTL Lv   2 Term 09 38w3d  3.572 kg (7 lb 14 oz) M CS-Unspec   WADE      Birth Comments: repeat in labor, PPTL      Name: Darin Harry)   1 Term 06 39w3d  3.629 kg (8 lb) M CS   WADE      Birth Comments: arrest of dilation, ROT position, non-reassuring FHT      Name: Deniz Chacon     Past Medical History:   Diagnosis Date     LSIL (low grade squamous intraepithelial lesion) on Pap smear     colp- focal koilocytosis     NO ACTIVE PROBLEMS      Shingles 2015     Past Surgical History:   Procedure Laterality Date     C/SECTION, LOW TRANSVERSE  ,     , Low Transverse     HC TOOTH EXTRACTION W/FORCEP      wisdom teeth     TUBAL LIGATION      PPTL     Family History   Problem Relation Age of Onset     Circulatory Mother         blood clots after SAB     Gastrointestinal Disease Mother         polyps removed     Thyroid Disease Mother         hypothyroid     Hypertension Mother      Macular Degeneration Mother      Hypertension Father      Hypertension Paternal Grandmother      Diabetes Paternal Grandmother      Arthritis Maternal Grandmother      Osteoporosis Maternal Grandmother      Hypertension Maternal Grandfather      Eye Disorder Maternal Grandfather         glacoma     Heart Disease Maternal Grandfather         MI     Cancer Paternal Grandfather         melanoma     Blood Disease Paternal Grandfather         hep     Respiratory Brother         asthma     Social History     Socioeconomic History     Marital status:      Spouse name: Oni Benedict     Number of children: 2     Years of education: 19     Highest education level: Not on file   Occupational History     Occupation: assistant prof     Comment: geography     Employer: [employer]   Social Needs     Financial resource strain: Not on file     Food insecurity:     Worry: Not on file     Inability: Not on file     Transportation needs:     Medical: Not on file     Non-medical: Not on file   Tobacco  "Use     Smoking status: Never Smoker     Smokeless tobacco: Never Used   Substance and Sexual Activity     Alcohol use: Yes     Alcohol/week: 0.0 oz     Comment: occ     Drug use: No     Sexual activity: Yes     Partners: Male     Birth control/protection: Surgical     Comment: tubal ligation   Lifestyle     Physical activity:     Days per week: Not on file     Minutes per session: Not on file     Stress: Not on file   Relationships     Social connections:     Talks on phone: Not on file     Gets together: Not on file     Attends Muslim service: Not on file     Active member of club or organization: Not on file     Attends meetings of clubs or organizations: Not on file     Relationship status: Not on file     Intimate partner violence:     Fear of current or ex partner: Not on file     Emotionally abused: Not on file     Physically abused: Not on file     Forced sexual activity: Not on file   Other Topics Concern     Not on file   Social History Narrative            Current Outpatient Medications:      Multiple Vitamins-Minerals (MULTIVITAMIN ADULT PO), , Disp: , Rfl:      norgestimate-ethinyl estradiol (ORTHO-CYCLEN, SPRINTEC) 0.25-35 MG-MCG per tablet, Take 1 tablet by mouth daily Active tablets daily for prevention of menses, Disp: 112 tablet, Rfl: 3     Allergies   Allergen Reactions     Penicillins Hives       Past medical, surgical, social and family history were reviewed and updated in EPIC.    EXAM:  BP 94/50   Temp 97.9  F (36.6  C) (Oral)   Ht 1.575 m (5' 2\")   Wt 59 kg (130 lb)   LMP 07/28/2019   BMI 23.78 kg/m     BMI: Body mass index is 23.78 kg/m .  Constitutional: healthy, alert and no distress  Head: Normocephalic. No masses, lesions, tenderness or abnormalities  Neck: Neck supple. Trachea midline. No adenopathy. Thyroid symmetric, normal size.   Cardiovascular: RRR.   Respiratory: Negative.   Breast: Breasts reveal mild symmetric fibrocystic densities, but there are no dominant, discrete, " fixed or suspicious masses found.  Gastrointestinal: Abdomen soft, non-tender, non-distended. No masses, organomegaly.  :  Vulva:  No external lesions, normal female hair distribution, no inguinal adenopathy.    Urethra:  Midline, non-tender, well supported, no discharge  Vagina:  Moist, pink, no abnormal discharge, no lesions  Uterus:  Normal size , non-tender, freely mobile  Ovaries:  No masses appreciated, non-tender, mobile  Rectal Exam: deferred  Musculoskeletal: extremities normal  Skin: no suspicious lesions or rashes  Psychiatric: Affect appropriate, cooperative,mentation appears normal.     COUNSELING:   Reviewed preventive health counseling, as reflected in patient instructions   reports that she has never smoked. She has never used smokeless tobacco.    Body mass index is 23.78 kg/m .    FRAX Risk Assessment    ASSESSMENT:  48 year old female with satisfactory annual exam  (Z01.419) Encounter for gynecological examination without abnormal finding  (primary encounter diagnosis)  Comment: OCP  Plan: norgestimate-ethinyl estradiol         (ORTHO-CYCLEN/SPRINTEC) 0.25-35 MG-MCG tablet        Refill of OCP was done since doing well.    Plan pap in 2020 and fasting labs again in 2021 since were all normal last year in 2018      Laura Philippe MD

## 2019-08-21 NOTE — PATIENT INSTRUCTIONS
Discussed mother with colon polyps and new colonoscopy recommendations to drop age to 45.  This is not yet covered by insurance and she has no symptoms so will recheck next year to see if covered.

## 2019-09-30 ENCOUNTER — ALLIED HEALTH/NURSE VISIT (OUTPATIENT)
Dept: NURSING | Facility: CLINIC | Age: 49
End: 2019-09-30
Payer: OTHER GOVERNMENT

## 2019-09-30 DIAGNOSIS — Z23 NEED FOR PROPHYLACTIC VACCINATION AND INOCULATION AGAINST INFLUENZA: Primary | ICD-10-CM

## 2019-09-30 PROCEDURE — 90686 IIV4 VACC NO PRSV 0.5 ML IM: CPT

## 2019-09-30 PROCEDURE — 90471 IMMUNIZATION ADMIN: CPT

## 2020-08-17 ENCOUNTER — ANCILLARY PROCEDURE (OUTPATIENT)
Dept: MAMMOGRAPHY | Facility: CLINIC | Age: 50
End: 2020-08-17
Attending: OBSTETRICS & GYNECOLOGY
Payer: OTHER GOVERNMENT

## 2020-08-17 DIAGNOSIS — Z12.31 VISIT FOR SCREENING MAMMOGRAM: ICD-10-CM

## 2020-08-17 PROCEDURE — 77067 SCR MAMMO BI INCL CAD: CPT | Mod: TC

## 2020-08-31 ENCOUNTER — OFFICE VISIT (OUTPATIENT)
Dept: OBGYN | Facility: CLINIC | Age: 50
End: 2020-08-31
Payer: OTHER GOVERNMENT

## 2020-08-31 VITALS
WEIGHT: 133.7 LBS | BODY MASS INDEX: 24.61 KG/M2 | HEART RATE: 98 BPM | DIASTOLIC BLOOD PRESSURE: 70 MMHG | HEIGHT: 62 IN | SYSTOLIC BLOOD PRESSURE: 109 MMHG | OXYGEN SATURATION: 96 %

## 2020-08-31 DIAGNOSIS — Z12.4 PAP SMEAR FOR CERVICAL CANCER SCREENING: ICD-10-CM

## 2020-08-31 DIAGNOSIS — Z01.419 ENCOUNTER FOR GYNECOLOGICAL EXAMINATION WITHOUT ABNORMAL FINDING: Primary | ICD-10-CM

## 2020-08-31 PROCEDURE — G0145 SCR C/V CYTO,THINLAYER,RESCR: HCPCS | Performed by: OBSTETRICS & GYNECOLOGY

## 2020-08-31 PROCEDURE — G0476 HPV COMBO ASSAY CA SCREEN: HCPCS | Performed by: OBSTETRICS & GYNECOLOGY

## 2020-08-31 PROCEDURE — 99396 PREV VISIT EST AGE 40-64: CPT | Performed by: OBSTETRICS & GYNECOLOGY

## 2020-08-31 RX ORDER — NORGESTIMATE AND ETHINYL ESTRADIOL 0.25-0.035
1 KIT ORAL DAILY
Qty: 112 TABLET | Refills: 4 | Status: SHIPPED | OUTPATIENT
Start: 2020-08-31 | End: 2021-10-12

## 2020-08-31 ASSESSMENT — MIFFLIN-ST. JEOR: SCORE: 1194.71

## 2020-08-31 NOTE — PROGRESS NOTES
Maria Teresa is a 49 year old  female who presents for annual exam.     Menses are regular q 28-30 days and normal lasting 4 days.  Menses flow: normal.  Patient's last menstrual period was 2020.. Using oral contraceptives for contraception.  She is not currently considering pregnancy.  Besides routine health maintenance, she has no other health concerns today .  Boys are both are in private school so going full time with Covid. 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   GYNECOLOGIC HISTORY:  Menarche:   Maria Teresa is not sexually active   History sexually transmitted infections:No STD history  STI testing offered?  Declined  LAURA exposure: Unknown  History of abnormal Pap smear: NO - age 30-65 PAP every 5 years with negative HPV co-testing recommended  Family history of breast CA: No  Family history of uterine/ovarian CA: No    Family history of colon CA: No    HEALTH MAINTENANCE:  Cholesterol: (  Cholesterol   Date Value Ref Range Status   2018 189 <200 mg/dL Final   2015 167 <200 mg/dL Final     Comment:     LDL Cholesterol is the primary guide to therapy.   The NCEP recommends further evaluation of: patients with cholesterol greater   than 200 mg/dL if additional risk factors are present, cholesterol greater   than   240 mg/dL, triglycerides greater than 150 mg/dL, or HDL less than 40 mg/dL.      History of abnormal lipids: No  Mammo: 2020 . History of abnormal Mammo: No.  Regular Self Breast Exams: Yes  Calcium/Vitamin D intake: source:  dairy, multivit Adequate? Yes  TSH: (  TSH   Date Value Ref Range Status   2018 1.80 0.40 - 4.00 mU/L Final    )  Pap; (  Lab Results   Component Value Date    PAP NIL 07/10/2015    PAP NIL 2012    PAP NIL 2009    )    HISTORY:  OB History    Para Term  AB Living   2 2 2 0 0 2   SAB TAB Ectopic Multiple Live Births   0 0 0 0 2      # Outcome Date GA Lbr Tu/  Weight Sex Delivery Anes PTL Lv   2 Term 09 38w3d  3.572 kg (7 lb 14 oz) M CS-Unspec   WADE      Birth Comments: repeat in labor, PPTL      Name: Darin (Srinivas)   1 Term 06 39w3d  3.629 kg (8 lb) M CS   WADE      Birth Comments: arrest of dilation, ROT position, non-reassuring FHT      Name: Deniz Chacon     Past Medical History:   Diagnosis Date     LSIL (low grade squamous intraepithelial lesion) on Pap smear     colp- focal koilocytosis     NO ACTIVE PROBLEMS      Shingles 2015     Past Surgical History:   Procedure Laterality Date     C/SECTION, LOW TRANSVERSE  ,     , Low Transverse     HC TOOTH EXTRACTION W/FORCEP      wisdom teeth     TUBAL LIGATION      PPTL     Family History   Problem Relation Age of Onset     Circulatory Mother         blood clots after SAB     Gastrointestinal Disease Mother         polyps removed     Thyroid Disease Mother         hypothyroid     Hypertension Mother      Macular Degeneration Mother      Hypertension Father      Hypertension Paternal Grandmother      Diabetes Paternal Grandmother      Arthritis Maternal Grandmother      Osteoporosis Maternal Grandmother      Hypertension Maternal Grandfather      Eye Disorder Maternal Grandfather         glacoma     Heart Disease Maternal Grandfather         MI     Cancer Paternal Grandfather         melanoma     Blood Disease Paternal Grandfather         hep     Respiratory Brother         asthma     Social History     Socioeconomic History     Marital status:      Spouse name: Oni Benedict     Number of children: 2     Years of education: 19     Highest education level: Not on file   Occupational History     Occupation: assistant prof     Comment: geography     Employer: [employer]   Social Needs     Financial resource strain: Not on file     Food insecurity     Worry: Not on file     Inability: Not on file     Transportation needs     Medical: Not on file     Non-medical: Not on file  "  Tobacco Use     Smoking status: Never Smoker     Smokeless tobacco: Never Used   Substance and Sexual Activity     Alcohol use: Yes     Alcohol/week: 0.0 standard drinks     Frequency: 2-3 times a week     Drinks per session: 1 or 2     Binge frequency: Never     Comment: occ     Drug use: No     Sexual activity: Yes     Partners: Male     Birth control/protection: Surgical, Pill     Comment: tubal ligation   Lifestyle     Physical activity     Days per week: Not on file     Minutes per session: Not on file     Stress: Not on file   Relationships     Social connections     Talks on phone: Not on file     Gets together: Not on file     Attends Confucianist service: Not on file     Active member of club or organization: Not on file     Attends meetings of clubs or organizations: Not on file     Relationship status: Not on file     Intimate partner violence     Fear of current or ex partner: Not on file     Emotionally abused: Not on file     Physically abused: Not on file     Forced sexual activity: Not on file   Other Topics Concern     Not on file   Social History Narrative            Current Outpatient Medications:      Multiple Vitamins-Minerals (MULTIVITAMIN ADULT PO), , Disp: , Rfl:      norgestimate-ethinyl estradiol (ORTHO-CYCLEN/SPRINTEC) 0.25-35 MG-MCG tablet, Take 1 tablet by mouth daily Active tablets daily for prevention of menses, Disp: 112 tablet, Rfl: 4     Allergies   Allergen Reactions     Penicillins Hives       Past medical, surgical, social and family history were reviewed and updated in [facility].    EXAM:  /70   Pulse 98   Ht 1.575 m (5' 2\")   Wt 60.6 kg (133 lb 11.2 oz)   LMP 08/24/2020   SpO2 96%   BMI 24.45 kg/m     BMI: Body mass index is 24.45 kg/m .  Constitutional: healthy, alert and no distress  Head: Normocephalic. No masses, lesions, tenderness or abnormalities  Neck: Neck supple. Trachea midline. No adenopathy. Thyroid symmetric, normal size.   Cardiovascular: RRR. "   Respiratory: Negative.   Breast: Breasts reveal mild symmetric fibrocystic densities, but there are no dominant, discrete, fixed or suspicious masses found.  Gastrointestinal: Abdomen soft, non-tender, non-distended. No masses, organomegaly.  :  Vulva:  No external lesions, normal female hair distribution, no inguinal adenopathy.    Urethra:  Midline, non-tender, well supported, no discharge  Vagina:  Moist, pink, no abnormal discharge, no lesions  Uterus:  Normal size , non-tender, freely mobile  Ovaries:  No masses appreciated, non-tender, mobile  Rectal Exam: deferred  Musculoskeletal: extremities normal  Skin: no suspicious lesions or rashes  Psychiatric: Affect appropriate, cooperative,mentation appears normal.     COUNSELING:   Reviewed preventive health counseling, as reflected in patient instructions   reports that she has never smoked. She has never used smokeless tobacco.    Body mass index is 24.45 kg/m .    FRAX Risk Assessment    ASSESSMENT:  49 year old female with satisfactory annual exam  (Z01.419) Encounter for gynecological examination without abnormal finding  (primary encounter diagnosis)  Comment: OCP  Plan: norgestimate-ethinyl estradiol (ORTHO-CYCLEN)         0.25-35 MG-MCG tablet        Refill of OCP was done and using continuously.     (Z12.4) Pap smear for cervical cancer screening  Plan: HPV High Risk Types DNA Cervical, Pap imaged         thin layer screen with HPV - recommended age 30        - 65 years (select HPV order below)        Discussed sending pap smear for HPV typing as well and that if both pap and HPV are negative, then can have q5 year pap smears.    Plan fasting labs next year with annual. (normal in 2018)    Laura Philippe MD

## 2020-09-03 LAB
COPATH REPORT: NORMAL
PAP: NORMAL

## 2020-09-04 LAB
FINAL DIAGNOSIS: NORMAL
HPV HR 12 DNA CVX QL NAA+PROBE: NEGATIVE
HPV16 DNA SPEC QL NAA+PROBE: NEGATIVE
HPV18 DNA SPEC QL NAA+PROBE: NEGATIVE
SPECIMEN DESCRIPTION: NORMAL
SPECIMEN SOURCE CVX/VAG CYTO: NORMAL

## 2020-10-03 ENCOUNTER — APPOINTMENT (OUTPATIENT)
Dept: CT IMAGING | Facility: CLINIC | Age: 50
End: 2020-10-03
Attending: EMERGENCY MEDICINE
Payer: OTHER GOVERNMENT

## 2020-10-03 ENCOUNTER — ANESTHESIA (OUTPATIENT)
Dept: SURGERY | Facility: CLINIC | Age: 50
End: 2020-10-03
Payer: OTHER GOVERNMENT

## 2020-10-03 ENCOUNTER — ANESTHESIA EVENT (OUTPATIENT)
Dept: SURGERY | Facility: CLINIC | Age: 50
End: 2020-10-03
Payer: OTHER GOVERNMENT

## 2020-10-03 ENCOUNTER — HOSPITAL ENCOUNTER (OUTPATIENT)
Facility: CLINIC | Age: 50
Setting detail: OBSERVATION
Discharge: HOME | End: 2020-10-03
Attending: EMERGENCY MEDICINE | Admitting: SURGERY
Payer: OTHER GOVERNMENT

## 2020-10-03 ENCOUNTER — SURGERY (OUTPATIENT)
Age: 50
End: 2020-10-03
Payer: OTHER GOVERNMENT

## 2020-10-03 ENCOUNTER — OFFICE VISIT (OUTPATIENT)
Dept: URGENT CARE | Facility: URGENT CARE | Age: 50
End: 2020-10-03
Payer: OTHER GOVERNMENT

## 2020-10-03 VITALS
HEIGHT: 62 IN | DIASTOLIC BLOOD PRESSURE: 75 MMHG | WEIGHT: 133 LBS | TEMPERATURE: 95.2 F | HEART RATE: 90 BPM | OXYGEN SATURATION: 96 % | RESPIRATION RATE: 8 BRPM | SYSTOLIC BLOOD PRESSURE: 125 MMHG | BODY MASS INDEX: 24.48 KG/M2

## 2020-10-03 VITALS
TEMPERATURE: 98 F | SYSTOLIC BLOOD PRESSURE: 117 MMHG | HEIGHT: 62 IN | OXYGEN SATURATION: 98 % | DIASTOLIC BLOOD PRESSURE: 67 MMHG | BODY MASS INDEX: 24.48 KG/M2 | HEART RATE: 119 BPM | WEIGHT: 133 LBS

## 2020-10-03 DIAGNOSIS — D72.829 LEUKOCYTOSIS, UNSPECIFIED TYPE: ICD-10-CM

## 2020-10-03 DIAGNOSIS — R10.84 ABDOMINAL PAIN, GENERALIZED: ICD-10-CM

## 2020-10-03 DIAGNOSIS — B34.9 VIRAL SYNDROME: Primary | ICD-10-CM

## 2020-10-03 DIAGNOSIS — K35.30 ACUTE APPENDICITIS WITH LOCALIZED PERITONITIS, WITHOUT PERFORATION, ABSCESS, OR GANGRENE: ICD-10-CM

## 2020-10-03 DIAGNOSIS — D72.825 BANDEMIA: ICD-10-CM

## 2020-10-03 LAB
ALBUMIN SERPL-MCNC: 3.4 G/DL (ref 3.4–5)
ALBUMIN UR-MCNC: 30 MG/DL
ALP SERPL-CCNC: 81 U/L (ref 40–150)
ALT SERPL W P-5'-P-CCNC: 15 U/L (ref 0–50)
ANION GAP SERPL CALCULATED.3IONS-SCNC: 8 MMOL/L (ref 3–14)
APPEARANCE UR: CLEAR
AST SERPL W P-5'-P-CCNC: 9 U/L (ref 0–45)
BACTERIA #/AREA URNS HPF: ABNORMAL /HPF
BASOPHILS # BLD AUTO: 0 10E9/L (ref 0–0.2)
BASOPHILS NFR BLD AUTO: 0.1 %
BILIRUB SERPL-MCNC: 0.7 MG/DL (ref 0.2–1.3)
BILIRUB UR QL STRIP: ABNORMAL
BUN SERPL-MCNC: 11 MG/DL (ref 7–30)
CALCIUM SERPL-MCNC: 8.8 MG/DL (ref 8.5–10.1)
CHLORIDE SERPL-SCNC: 106 MMOL/L (ref 94–109)
CO2 SERPL-SCNC: 24 MMOL/L (ref 20–32)
COLOR UR AUTO: YELLOW
CREAT SERPL-MCNC: 0.82 MG/DL (ref 0.52–1.04)
DIFFERENTIAL METHOD BLD: ABNORMAL
EOSINOPHIL # BLD AUTO: 0.1 10E9/L (ref 0–0.7)
EOSINOPHIL NFR BLD AUTO: 0.7 %
ERYTHROCYTE [DISTWIDTH] IN BLOOD BY AUTOMATED COUNT: 13.1 % (ref 10–15)
GFR SERPL CREATININE-BSD FRML MDRD: 85 ML/MIN/{1.73_M2}
GLUCOSE SERPL-MCNC: 101 MG/DL (ref 70–99)
GLUCOSE UR STRIP-MCNC: NEGATIVE MG/DL
HCT VFR BLD AUTO: 39.6 % (ref 35–47)
HGB BLD-MCNC: 12.8 G/DL (ref 11.7–15.7)
HGB UR QL STRIP: ABNORMAL
KETONES UR STRIP-MCNC: >=80 MG/DL
LABORATORY COMMENT REPORT: NORMAL
LACTATE BLD-SCNC: 1.7 MMOL/L (ref 0.7–2)
LEUKOCYTE ESTERASE UR QL STRIP: NEGATIVE
LIPASE SERPL-CCNC: 64 U/L (ref 73–393)
LYMPHOCYTES # BLD AUTO: 0.7 10E9/L (ref 0.8–5.3)
LYMPHOCYTES NFR BLD AUTO: 4.3 %
MCH RBC QN AUTO: 30.8 PG (ref 26.5–33)
MCHC RBC AUTO-ENTMCNC: 32.3 G/DL (ref 31.5–36.5)
MCV RBC AUTO: 95 FL (ref 78–100)
MONOCYTES # BLD AUTO: 0.5 10E9/L (ref 0–1.3)
MONOCYTES NFR BLD AUTO: 3.2 %
MUCOUS THREADS #/AREA URNS LPF: PRESENT /LPF
NEUTROPHILS # BLD AUTO: 15.5 10E9/L (ref 1.6–8.3)
NEUTROPHILS NFR BLD AUTO: 91.7 %
NITRATE UR QL: NEGATIVE
NON-SQ EPI CELLS #/AREA URNS LPF: ABNORMAL /LPF
PH UR STRIP: 5.5 PH (ref 5–7)
PLATELET # BLD AUTO: 283 10E9/L (ref 150–450)
POTASSIUM SERPL-SCNC: 3.3 MMOL/L (ref 3.4–5.3)
PROT SERPL-MCNC: 7.5 G/DL (ref 6.8–8.8)
RBC # BLD AUTO: 4.15 10E12/L (ref 3.8–5.2)
RBC #/AREA URNS AUTO: ABNORMAL /HPF
SARS-COV-2 RNA SPEC QL NAA+PROBE: NEGATIVE
SARS-COV-2 RNA SPEC QL NAA+PROBE: NORMAL
SODIUM SERPL-SCNC: 138 MMOL/L (ref 133–144)
SOURCE: ABNORMAL
SP GR UR STRIP: >1.03 (ref 1–1.03)
SPECIMEN SOURCE: NORMAL
SPECIMEN SOURCE: NORMAL
UROBILINOGEN UR STRIP-ACNC: 0.2 EU/DL (ref 0.2–1)
WBC # BLD AUTO: 16.9 10E9/L (ref 4–11)
WBC #/AREA URNS AUTO: ABNORMAL /HPF

## 2020-10-03 PROCEDURE — 96365 THER/PROPH/DIAG IV INF INIT: CPT | Mod: 59

## 2020-10-03 PROCEDURE — 81001 URINALYSIS AUTO W/SCOPE: CPT | Performed by: PHYSICIAN ASSISTANT

## 2020-10-03 PROCEDURE — 36415 COLL VENOUS BLD VENIPUNCTURE: CPT | Performed by: PHYSICIAN ASSISTANT

## 2020-10-03 PROCEDURE — U0003 INFECTIOUS AGENT DETECTION BY NUCLEIC ACID (DNA OR RNA); SEVERE ACUTE RESPIRATORY SYNDROME CORONAVIRUS 2 (SARS-COV-2) (CORONAVIRUS DISEASE [COVID-19]), AMPLIFIED PROBE TECHNIQUE, MAKING USE OF HIGH THROUGHPUT TECHNOLOGIES AS DESCRIBED BY CMS-2020-01-R: HCPCS | Performed by: EMERGENCY MEDICINE

## 2020-10-03 PROCEDURE — 88304 TISSUE EXAM BY PATHOLOGIST: CPT | Mod: 26 | Performed by: PATHOLOGY

## 2020-10-03 PROCEDURE — 44970 LAPAROSCOPY APPENDECTOMY: CPT | Mod: AS | Performed by: PHYSICIAN ASSISTANT

## 2020-10-03 PROCEDURE — 80053 COMPREHEN METABOLIC PANEL: CPT | Performed by: PHYSICIAN ASSISTANT

## 2020-10-03 PROCEDURE — 83690 ASSAY OF LIPASE: CPT | Performed by: EMERGENCY MEDICINE

## 2020-10-03 PROCEDURE — 250N000011 HC RX IP 250 OP 636: Performed by: NURSE ANESTHETIST, CERTIFIED REGISTERED

## 2020-10-03 PROCEDURE — 250N000009 HC RX 250: Performed by: SURGERY

## 2020-10-03 PROCEDURE — 360N000020 HC SURGERY LEVEL 3 1ST 30 MIN: Performed by: SURGERY

## 2020-10-03 PROCEDURE — 250N000013 HC RX MED GY IP 250 OP 250 PS 637: Performed by: PHYSICIAN ASSISTANT

## 2020-10-03 PROCEDURE — 99204 OFFICE O/P NEW MOD 45 MIN: CPT | Mod: 57 | Performed by: SURGERY

## 2020-10-03 PROCEDURE — 85025 COMPLETE CBC W/AUTO DIFF WBC: CPT | Performed by: PHYSICIAN ASSISTANT

## 2020-10-03 PROCEDURE — 258N000003 HC RX IP 258 OP 636: Performed by: SURGERY

## 2020-10-03 PROCEDURE — 74177 CT ABD & PELVIS W/CONTRAST: CPT

## 2020-10-03 PROCEDURE — 99203 OFFICE O/P NEW LOW 30 MIN: CPT | Performed by: PHYSICIAN ASSISTANT

## 2020-10-03 PROCEDURE — 258N000001 HC RX 258: Performed by: SURGERY

## 2020-10-03 PROCEDURE — 761N000007 HC RECOVERY PHASE 2 EACH 15 MINS: Performed by: SURGERY

## 2020-10-03 PROCEDURE — 96375 TX/PRO/DX INJ NEW DRUG ADDON: CPT | Mod: 59

## 2020-10-03 PROCEDURE — 999N000139 HC STATISTIC PRE-PROCEDURE ASSESSMENT II: Performed by: SURGERY

## 2020-10-03 PROCEDURE — G0378 HOSPITAL OBSERVATION PER HR: HCPCS

## 2020-10-03 PROCEDURE — 250N000009 HC RX 250: Performed by: EMERGENCY MEDICINE

## 2020-10-03 PROCEDURE — 250N000003 HC SEVOFLURANE, EA 15 MIN: Performed by: SURGERY

## 2020-10-03 PROCEDURE — 272N000001 HC OR GENERAL SUPPLY STERILE: Performed by: SURGERY

## 2020-10-03 PROCEDURE — 258N000003 HC RX IP 258 OP 636: Performed by: EMERGENCY MEDICINE

## 2020-10-03 PROCEDURE — 99285 EMERGENCY DEPT VISIT HI MDM: CPT | Mod: 25

## 2020-10-03 PROCEDURE — 44970 LAPAROSCOPY APPENDECTOMY: CPT | Performed by: SURGERY

## 2020-10-03 PROCEDURE — 258N000003 HC RX IP 258 OP 636: Performed by: NURSE ANESTHETIST, CERTIFIED REGISTERED

## 2020-10-03 PROCEDURE — 370N000002 HC ANESTHESIA TECHNICAL FEE, EACH ADDTL 15 MIN: Performed by: SURGERY

## 2020-10-03 PROCEDURE — 88304 TISSUE EXAM BY PATHOLOGIST: CPT | Mod: TC | Performed by: SURGERY

## 2020-10-03 PROCEDURE — 250N000009 HC RX 250: Performed by: NURSE ANESTHETIST, CERTIFIED REGISTERED

## 2020-10-03 PROCEDURE — 360N000021 HC SURGERY LEVEL 3 EA 15 ADDTL MIN: Performed by: SURGERY

## 2020-10-03 PROCEDURE — 80053 COMPREHEN METABOLIC PANEL: CPT | Performed by: EMERGENCY MEDICINE

## 2020-10-03 PROCEDURE — 96361 HYDRATE IV INFUSION ADD-ON: CPT | Mod: 59

## 2020-10-03 PROCEDURE — C9803 HOPD COVID-19 SPEC COLLECT: HCPCS

## 2020-10-03 PROCEDURE — 761N000001 HC RECOVERY PHASE 1 LEVEL 1 FIRST HR: Performed by: SURGERY

## 2020-10-03 PROCEDURE — 83605 ASSAY OF LACTIC ACID: CPT | Performed by: EMERGENCY MEDICINE

## 2020-10-03 PROCEDURE — 250N000011 HC RX IP 250 OP 636: Performed by: EMERGENCY MEDICINE

## 2020-10-03 PROCEDURE — 250N000011 HC RX IP 250 OP 636: Performed by: SURGERY

## 2020-10-03 PROCEDURE — 370N000001 HC ANESTHESIA TECHNICAL FEE, 1ST 30 MIN: Performed by: SURGERY

## 2020-10-03 RX ORDER — NALOXONE HYDROCHLORIDE 0.4 MG/ML
.1-.4 INJECTION, SOLUTION INTRAMUSCULAR; INTRAVENOUS; SUBCUTANEOUS
Status: DISCONTINUED | OUTPATIENT
Start: 2020-10-03 | End: 2020-10-04 | Stop reason: HOSPADM

## 2020-10-03 RX ORDER — IOPAMIDOL 755 MG/ML
66 INJECTION, SOLUTION INTRAVASCULAR ONCE
Status: COMPLETED | OUTPATIENT
Start: 2020-10-03 | End: 2020-10-03

## 2020-10-03 RX ORDER — ONDANSETRON 4 MG/1
4 TABLET, ORALLY DISINTEGRATING ORAL EVERY 6 HOURS PRN
Status: DISCONTINUED | OUTPATIENT
Start: 2020-10-03 | End: 2020-10-04 | Stop reason: HOSPADM

## 2020-10-03 RX ORDER — CIPROFLOXACIN 500 MG/1
500 TABLET, FILM COATED ORAL 2 TIMES DAILY
Qty: 6 TABLET | Refills: 0 | Status: SHIPPED | OUTPATIENT
Start: 2020-10-03 | End: 2020-10-06

## 2020-10-03 RX ORDER — ACETAMINOPHEN 325 MG/1
650 TABLET ORAL EVERY 4 HOURS PRN
Status: DISCONTINUED | OUTPATIENT
Start: 2020-10-03 | End: 2020-10-04 | Stop reason: HOSPADM

## 2020-10-03 RX ORDER — DEXAMETHASONE SODIUM PHOSPHATE 4 MG/ML
INJECTION, SOLUTION INTRA-ARTICULAR; INTRALESIONAL; INTRAMUSCULAR; INTRAVENOUS; SOFT TISSUE PRN
Status: DISCONTINUED | OUTPATIENT
Start: 2020-10-03 | End: 2020-10-04

## 2020-10-03 RX ORDER — HYDROCODONE BITARTRATE AND ACETAMINOPHEN 5; 325 MG/1; MG/1
1 TABLET ORAL
Status: COMPLETED | OUTPATIENT
Start: 2020-10-03 | End: 2020-10-03

## 2020-10-03 RX ORDER — ONDANSETRON 2 MG/ML
INJECTION INTRAMUSCULAR; INTRAVENOUS PRN
Status: DISCONTINUED | OUTPATIENT
Start: 2020-10-03 | End: 2020-10-04

## 2020-10-03 RX ORDER — SODIUM CHLORIDE, SODIUM LACTATE, POTASSIUM CHLORIDE, CALCIUM CHLORIDE 600; 310; 30; 20 MG/100ML; MG/100ML; MG/100ML; MG/100ML
INJECTION, SOLUTION INTRAVENOUS CONTINUOUS
Status: DISCONTINUED | OUTPATIENT
Start: 2020-10-03 | End: 2020-10-03 | Stop reason: HOSPADM

## 2020-10-03 RX ORDER — HYDROCODONE BITARTRATE AND ACETAMINOPHEN 5; 325 MG/1; MG/1
1-2 TABLET ORAL EVERY 4 HOURS PRN
Qty: 15 TABLET | Refills: 0 | Status: SHIPPED | OUTPATIENT
Start: 2020-10-03 | End: 2021-03-02

## 2020-10-03 RX ORDER — HYDROMORPHONE HYDROCHLORIDE 1 MG/ML
0.3 INJECTION, SOLUTION INTRAMUSCULAR; INTRAVENOUS; SUBCUTANEOUS
Status: DISCONTINUED | OUTPATIENT
Start: 2020-10-03 | End: 2020-10-04 | Stop reason: HOSPADM

## 2020-10-03 RX ORDER — AMOXICILLIN 250 MG
1-2 CAPSULE ORAL 2 TIMES DAILY
Qty: 15 TABLET | Refills: 0 | Status: SHIPPED | OUTPATIENT
Start: 2020-10-03 | End: 2021-03-02

## 2020-10-03 RX ORDER — CEFOTETAN DISODIUM 2 G/20ML
2 INJECTION, POWDER, FOR SOLUTION INTRAMUSCULAR; INTRAVENOUS ONCE
Status: COMPLETED | OUTPATIENT
Start: 2020-10-03 | End: 2020-10-03

## 2020-10-03 RX ORDER — FENTANYL CITRATE 50 UG/ML
25-50 INJECTION, SOLUTION INTRAMUSCULAR; INTRAVENOUS
Status: DISCONTINUED | OUTPATIENT
Start: 2020-10-03 | End: 2020-10-03 | Stop reason: HOSPADM

## 2020-10-03 RX ORDER — METRONIDAZOLE 500 MG/1
500 TABLET ORAL 3 TIMES DAILY
Qty: 9 TABLET | Refills: 0 | Status: SHIPPED | OUTPATIENT
Start: 2020-10-03 | End: 2020-10-06

## 2020-10-03 RX ORDER — LABETALOL HYDROCHLORIDE 5 MG/ML
10 INJECTION, SOLUTION INTRAVENOUS
Status: DISCONTINUED | OUTPATIENT
Start: 2020-10-03 | End: 2020-10-03 | Stop reason: HOSPADM

## 2020-10-03 RX ORDER — BUPIVACAINE HYDROCHLORIDE AND EPINEPHRINE 2.5; 5 MG/ML; UG/ML
INJECTION, SOLUTION INFILTRATION; PERINEURAL PRN
Status: DISCONTINUED | OUTPATIENT
Start: 2020-10-03 | End: 2020-10-03 | Stop reason: HOSPADM

## 2020-10-03 RX ORDER — ONDANSETRON 2 MG/ML
4 INJECTION INTRAMUSCULAR; INTRAVENOUS EVERY 30 MIN PRN
Status: DISCONTINUED | OUTPATIENT
Start: 2020-10-03 | End: 2020-10-03 | Stop reason: HOSPADM

## 2020-10-03 RX ORDER — FENTANYL CITRATE 50 UG/ML
INJECTION, SOLUTION INTRAMUSCULAR; INTRAVENOUS PRN
Status: DISCONTINUED | OUTPATIENT
Start: 2020-10-03 | End: 2020-10-04

## 2020-10-03 RX ORDER — PROCHLORPERAZINE MALEATE 10 MG
10 TABLET ORAL EVERY 6 HOURS PRN
Status: DISCONTINUED | OUTPATIENT
Start: 2020-10-03 | End: 2020-10-04 | Stop reason: HOSPADM

## 2020-10-03 RX ORDER — HYDROMORPHONE HYDROCHLORIDE 1 MG/ML
.3-.5 INJECTION, SOLUTION INTRAMUSCULAR; INTRAVENOUS; SUBCUTANEOUS EVERY 5 MIN PRN
Status: DISCONTINUED | OUTPATIENT
Start: 2020-10-03 | End: 2020-10-03 | Stop reason: HOSPADM

## 2020-10-03 RX ORDER — ONDANSETRON 4 MG/1
4 TABLET, ORALLY DISINTEGRATING ORAL EVERY 30 MIN PRN
Status: DISCONTINUED | OUTPATIENT
Start: 2020-10-03 | End: 2020-10-03 | Stop reason: HOSPADM

## 2020-10-03 RX ORDER — MAGNESIUM HYDROXIDE 1200 MG/15ML
LIQUID ORAL PRN
Status: DISCONTINUED | OUTPATIENT
Start: 2020-10-03 | End: 2020-10-03 | Stop reason: HOSPADM

## 2020-10-03 RX ORDER — ACETAMINOPHEN 650 MG/1
650 SUPPOSITORY RECTAL EVERY 4 HOURS PRN
Status: DISCONTINUED | OUTPATIENT
Start: 2020-10-03 | End: 2020-10-04 | Stop reason: HOSPADM

## 2020-10-03 RX ORDER — ONDANSETRON 2 MG/ML
4 INJECTION INTRAMUSCULAR; INTRAVENOUS EVERY 6 HOURS PRN
Status: DISCONTINUED | OUTPATIENT
Start: 2020-10-03 | End: 2020-10-04 | Stop reason: HOSPADM

## 2020-10-03 RX ORDER — KETOROLAC TROMETHAMINE 30 MG/ML
INJECTION, SOLUTION INTRAMUSCULAR; INTRAVENOUS PRN
Status: DISCONTINUED | OUTPATIENT
Start: 2020-10-03 | End: 2020-10-04

## 2020-10-03 RX ORDER — PROCHLORPERAZINE 25 MG
25 SUPPOSITORY, RECTAL RECTAL EVERY 12 HOURS PRN
Status: DISCONTINUED | OUTPATIENT
Start: 2020-10-03 | End: 2020-10-04 | Stop reason: HOSPADM

## 2020-10-03 RX ORDER — LIDOCAINE HYDROCHLORIDE 20 MG/ML
INJECTION, SOLUTION INFILTRATION; PERINEURAL PRN
Status: DISCONTINUED | OUTPATIENT
Start: 2020-10-03 | End: 2020-10-04

## 2020-10-03 RX ORDER — DEXTROSE MONOHYDRATE, SODIUM CHLORIDE, AND POTASSIUM CHLORIDE 50; 1.49; 4.5 G/1000ML; G/1000ML; G/1000ML
INJECTION, SOLUTION INTRAVENOUS CONTINUOUS
Status: DISCONTINUED | OUTPATIENT
Start: 2020-10-03 | End: 2020-10-04 | Stop reason: HOSPADM

## 2020-10-03 RX ORDER — LIDOCAINE 40 MG/G
CREAM TOPICAL
Status: DISCONTINUED | OUTPATIENT
Start: 2020-10-03 | End: 2020-10-04 | Stop reason: HOSPADM

## 2020-10-03 RX ORDER — MEPERIDINE HYDROCHLORIDE 25 MG/ML
12.5 INJECTION INTRAMUSCULAR; INTRAVENOUS; SUBCUTANEOUS EVERY 5 MIN PRN
Status: DISCONTINUED | OUTPATIENT
Start: 2020-10-03 | End: 2020-10-03 | Stop reason: HOSPADM

## 2020-10-03 RX ADMIN — KETOROLAC TROMETHAMINE 30 MG: 30 INJECTION, SOLUTION INTRAMUSCULAR at 19:50

## 2020-10-03 RX ADMIN — HYDROMORPHONE HYDROCHLORIDE 0.5 MG: 1 INJECTION, SOLUTION INTRAMUSCULAR; INTRAVENOUS; SUBCUTANEOUS at 20:49

## 2020-10-03 RX ADMIN — SODIUM CHLORIDE, POTASSIUM CHLORIDE, SODIUM LACTATE AND CALCIUM CHLORIDE: 600; 310; 30; 20 INJECTION, SOLUTION INTRAVENOUS at 18:37

## 2020-10-03 RX ADMIN — POTASSIUM CHLORIDE, DEXTROSE MONOHYDRATE AND SODIUM CHLORIDE: 150; 5; 450 INJECTION, SOLUTION INTRAVENOUS at 17:18

## 2020-10-03 RX ADMIN — HYDROCODONE BITARTRATE AND ACETAMINOPHEN 1 TABLET: 5; 325 TABLET ORAL at 21:30

## 2020-10-03 RX ADMIN — CEFOTETAN DISODIUM 2 G: 2 INJECTION, POWDER, FOR SOLUTION INTRAMUSCULAR; INTRAVENOUS at 15:07

## 2020-10-03 RX ADMIN — SODIUM CHLORIDE 60 ML: 9 INJECTION, SOLUTION INTRAVENOUS at 14:19

## 2020-10-03 RX ADMIN — SODIUM CHLORIDE 1000 ML: 9 INJECTION, SOLUTION INTRAVENOUS at 13:23

## 2020-10-03 RX ADMIN — ROCURONIUM BROMIDE 10 MG: 10 INJECTION INTRAVENOUS at 18:57

## 2020-10-03 RX ADMIN — ROCURONIUM BROMIDE 30 MG: 10 INJECTION INTRAVENOUS at 19:08

## 2020-10-03 RX ADMIN — PHENYLEPHRINE HYDROCHLORIDE 100 MCG: 10 INJECTION INTRAVENOUS at 19:14

## 2020-10-03 RX ADMIN — ONDANSETRON 4 MG: 2 INJECTION INTRAMUSCULAR; INTRAVENOUS at 19:50

## 2020-10-03 RX ADMIN — DEXAMETHASONE SODIUM PHOSPHATE 4 MG: 4 INJECTION, SOLUTION INTRA-ARTICULAR; INTRALESIONAL; INTRAMUSCULAR; INTRAVENOUS; SOFT TISSUE at 19:12

## 2020-10-03 RX ADMIN — SODIUM CHLORIDE 1000 ML: 900 IRRIGANT IRRIGATION at 18:27

## 2020-10-03 RX ADMIN — BUPIVACAINE HYDROCHLORIDE AND EPINEPHRINE BITARTRATE 30 ML: 2.5; .005 INJECTION, SOLUTION EPIDURAL; INFILTRATION; INTRACAUDAL; PERINEURAL at 19:53

## 2020-10-03 RX ADMIN — LIDOCAINE HYDROCHLORIDE 80 MG: 20 INJECTION, SOLUTION INFILTRATION; PERINEURAL at 18:57

## 2020-10-03 RX ADMIN — FENTANYL CITRATE 100 MCG: 50 INJECTION, SOLUTION INTRAMUSCULAR; INTRAVENOUS at 18:57

## 2020-10-03 RX ADMIN — MIDAZOLAM 2 MG: 1 INJECTION INTRAMUSCULAR; INTRAVENOUS at 18:49

## 2020-10-03 RX ADMIN — IOPAMIDOL 66 ML: 755 INJECTION, SOLUTION INTRAVENOUS at 14:19

## 2020-10-03 RX ADMIN — SUCCINYLCHOLINE CHLORIDE 80 MG: 20 INJECTION, SOLUTION INTRAMUSCULAR; INTRAVENOUS; PARENTERAL at 18:57

## 2020-10-03 RX ADMIN — SODIUM CHLORIDE, POTASSIUM CHLORIDE, SODIUM LACTATE AND CALCIUM CHLORIDE: 600; 310; 30; 20 INJECTION, SOLUTION INTRAVENOUS at 18:53

## 2020-10-03 RX ADMIN — SODIUM CHLORIDE 1000 ML: 900 IRRIGANT IRRIGATION at 19:20

## 2020-10-03 RX ADMIN — PHENYLEPHRINE HYDROCHLORIDE 100 MCG: 10 INJECTION INTRAVENOUS at 19:16

## 2020-10-03 ASSESSMENT — MIFFLIN-ST. JEOR
SCORE: 1192.88
SCORE: 1186.53

## 2020-10-03 NOTE — CONSULTS
General Surgery [facility] Consultation/H&P    Maria Teresa Benedict MRN#: [id number]   Age: 50 year old YOB: 1970     Date of Admission:          10/3/2020  Reason for consult/H&P: Possible appendicitis   Surgeon:      Toni Linton MD                  Chief Complaint:   Abdominal pain, right upper quadrant         History of Present Illness:   This patient is a 50 year old  female who presented to the [facility] ER with abdominal pain for two days. It worsened and localized to the RLQ. Denies fever, chills, change in BM or urination.   Denies having any previous episodes or abdominal surgery other than C-sections   No traums. History is obtained from the patient and chart.         Past Medical History:    has a past medical history of LSIL (low grade squamous intraepithelial lesion) on Pap smear (), NO ACTIVE PROBLEMS, and Shingles (2015).          Past Surgical History:     Past Surgical History:   Procedure Laterality Date     C/SECTION, LOW TRANSVERSE  ,     , Low Transverse     HC TOOTH EXTRACTION W/FORCEP      wisdom teeth     TUBAL LIGATION      PPTL            Medications:     Prior to Admission medications    Medication Sig Start Date End Date Taking? Authorizing Provider   Multiple Vitamins-Minerals (MULTIVITAMIN ADULT PO) Take 1 tablet by mouth daily    Yes Reported, Patient   norgestimate-ethinyl estradiol (ORTHO-CYCLEN) 0.25-35 MG-MCG tablet Take 1 tablet by mouth daily Active tablets daily for prevention of menses 20  Yes Laura Philippe MD            Allergies:     Allergies   Allergen Reactions     Penicillins Hives            Social History:     Social History     Tobacco Use     Smoking status: Never Smoker     Smokeless tobacco: Never Used   Substance Use Topics     Alcohol use: Yes     Alcohol/week: 0.0 standard drinks     Frequency: 2-3 times a week     Drinks per session: 1 or 2     Binge frequency: Never     Comment: occ       Verified Results  COMP METABOLIC PANEL WITH CBCA, LIPID PANEL AND TSH (CMP,CBCA,LIPFA,TSH) [id number] 07:09AM CORDEROKAYDEN LANDAHICARSON   [Sep 16, 2017 1:37PM CONSUELO MARLON]  labs stable  diabetes under good control  no changes to meds     Test Name Result Flag Reference   WHITE BLOOD COUNT 4.4 K/mcL  4.2-11.0   RED CELL COUNT 3.86 mil/mcL L 4.00-5.20   HEMOGLOBIN 12.5 g/dl  12.0-15.5   HEMATOCRIT 39.8 %  36.0-46.5   MEAN CORPUSCULAR VOLUME 103.1 fL H 78.0-100.0   MEAN CORPUSCULAR HEMOGLOBIN 32.4 pg  26.0-34.0   MEAN CORPUSCULAR HGB CONC 31.4 g/dl L 32.0-36.5   RDW-CV 13.6 %  11.0-15.0   PLATELET COUNT 182 K/mcL  140-450   DIFF TYPE      AUTOMATED DIFFERENTIAL   BETY% 56 %     LYM% 32 %     MON% 11 %     EOS% 1 %     BASO% 0 %     BETY ABS 2.4 K/mcL  1.8-7.7   LYM ABS 1.4 K/mcL  1.0-4.0   MON ABS 0.5 K/mcL  0.3-0.9   EOS ABS 0.1 K/mcL  0.1-0.5   BASO ABS 0.0 K/mcL  0.0-0.3   FASTING STATUS 12 hrs     SODIUM 143 mmol/L  135-145   POTASSIUM 4.6 mmol/L  3.4-5.1   CHLORIDE 103 mmol/L     CARBON DIOXIDE 31 mmol/L  21-32   ANION GAP 14 mmol/L  10-20   GLUCOSE 129 mg/dl H 65-99   BUN 16 mg/dl  6-20   CREATININE 0.70 mg/dl  0.51-0.95   GFR EST.AFRICAN AMER >90     eGFR results = or >90 mL/min/1.73m2 = Normal kidney function.   GFR EST.NONAFRI AMER >90     eGFR results = or >90 mL/min/1.73m2 = Normal kidney function.   BUN/CREATININE RATIO 23  7-25   CALCIUM 8.7 mg/dl  8.4-10.2   BILIRUBIN TOTAL 0.4 mg/dl  0.2-1.0   GOT/AST 21 Units/L  <38   GPT/ALT 25 Units/L  <79   ALKALINE PHOSPHATASE 96 Units/L     TOTAL PROTEIN 7.1 g/dl  6.4-8.2   ALBUMIN 3.9 g/dl  3.6-5.1   GLOBULIN (CALCULATED) 3.2 g/dl  2.0-4.0   A/G RATIO 1.2  1.0-2.4   FASTING STATUS 12 hrs     CHOLESTEROL 149 mg/dl  <200   Desirable            <200  Borderline High      200 to 239  High                 >=240   LDL CHOLESTEROL (CALCULATED) 67 mg/dl  <130   OPTIMAL               <100  NEAR OPTIMAL          100-129  BORDERLINE HIGH       130-159  HIGH                   "       Family History:    This patient has no significant relevant family history.  Family history is reviewed in detail.          Review of Systems:   Complete ROS is negative other than noted in the HPI.  C: NEGATIVE for fever, chills, change in weight  R: NEGATIVE for significant cough or SOB  CV: NEGATIVE for chest pain, palpitations or peripheral edema  GI:  NEGATIVE for dysuria, heartburn, or change in bowel habits  H: NEGATIVE for bleeding problems         Physical Exam:   Blood pressure 114/69, pulse 106, temperature 99.5  F (37.5  C), temperature source Oral, resp. rate 16, height 1.585 m (5' 2.4\"), weight 60.3 kg (133 lb), SpO2 99 %, not currently breastfeeding.  No intake/output data recorded.    General - This is a well developed, well nourished female .  HEENT - Normocephalic. Atraumatic. Moist mucous membranes. Pupils equal.  No scleral icterus. Nose normal.  Neck - Supple without masses. No cervical adenopathy or thyromegaly  Lungs - Breathing not labored  Chest - Not tender. CVA's nontender  Heart - Regular rate & rhythm   Abdomen - Soft, quite tender RLQ with referred tenderness left to right, nondistended with +bowel sounds, no organomegaly.  Extremities - Moves all extremities. Warm without edema. Pulses noted  Neurologic - Nonfocal. Alert and oriented          Data:   Labs:  Recent Labs   Lab Test 10/03/20  1208 08/09/18  1258 07/31/15  0912   WBC 16.9* 7.5 6.9   HGB 12.8 13.2 12.5   HCT 39.6 40.4 37.7    285 290     Recent Labs   Lab Test 10/03/20  1320 08/09/18  1258   POTASSIUM 3.3* 4.5   CHLORIDE 106 104   CO2 24 25   BUN 11 15   CR 0.82 0.80     Recent Labs   Lab Test 10/03/20  1320 08/09/18  1258   BILITOTAL 0.7 0.4   ALT 15 16   AST 9 14   ALKPHOS 81 59   LIPASE 64*  --      No lab results found.  Recent Labs   Lab Test 10/03/20  1320 08/09/18  1258   AIME 8.8 8.8     Recent Labs   Lab Test 10/03/20  1320 10/03/20  1220 08/09/18  1258   ANIONGAP 8  --  11   PROTEIN  --  30*  --  " 160-189  VERY HIGH             >=190   HDL CHOLESTEROL 57 mg/dl  >49   Low            <40  Borderline Low 40 to 49  Near Optimal   50 to 59  Optimal        >=60   TRIGLYCERIDES 125 mg/dl  <150   Normal                   <150  Borderline High          150 to 199  High                     200 to 499  Very High                >=500   NON-HDL CHOLESTEROL 92 mg/dl     Therapeutic Target:  CHD and risk equivalents <130  Multiple risk factors    <160  0 to 1 risk factors      <190   CHOLESTEROL/HDL RATIO 2.6  <4.5   TSH 4.177 mcUnits/mL  0.350-5.000     HEMOGLOBIN A1C GLYCOSYLATED [id number] 07:09AM MARLON CORDERO   [Sep 16, 2017 1:37PM MARLON CORDERO]  labs stable  diabetes under good control  no changes to meds     Test Name Result Flag Reference   HEMOGLOBIN A1C GLYH 6.8 % H 4.5-5.6   ----DIABETIC SCREENING---  NON DIABETIC                 <5.7%  INCREASED RISK                5.7-6.4%  DIAGNOSTIC FOR DIABETES      >6.4%     ----DIABETIC CONTROL---     A1C%           eAG mg/dL  6.0            126  6.5            140  7.0            154  7.5            169  8.0            183  8.5            197  9.0            212  9.5            226  10.0           240     URINALYSIS WITH MICROSCOPIC EXAM W/O C/S [id number] 07:09AM MARLON CORDERO   [Sep 16, 2017 1:37PM MARLON CORDERO]  labs stable  diabetes under good control  no changes to meds     Test Name Result Flag Reference   URINE COLOR YELLOW  YELLOW   APPEARANCE, URINE CLOUDY     URINE GLUCOSE NEGATIVE mg/dl  NEGATIVE   URINE BILIRUBIN NEGATIVE  NEGATIVE   KETONES NEGATIVE mg/dl  NEGATIVE   URINE SPECIFIC GRAVITY 1.020  1.005-1.030   RBC-URINE NEGATIVE  NEGATIVE   PH-URINE 5.0 Units  5.0-7.0   URINE PROTEIN NEGATIVE mg/dl  NEGATIVE   UROBILINOGEN-URINE 0.2 mg/dl  0.0-1.0   NITRITE NEGATIVE  NEGATIVE   WBC-URINE LARGE A NEGATIVE   SQUAMOUS EPITHELIAL CELLS 1 to 5 /HPF  0-5   ERYTHROCYTES 1 to 3 /HPF  0-3   LEUKOCYTES 26 to 100 /HPF H 0-5   BACTERIA NONE SEEN /HPF  NONE SEEN    ALBUMIN 3.4  --  3.8       CT scan of the abdomen: images reviewed. Enlarged inflamed appendix    Ultrasound of the abdomen: not done               Assessment:   Probable appendicitis best explore, risks and options reviewed in detail. She appears to understand and wishes to proceed.         Plan:    lap appendectomy today.        I have discussed the history, physical, and plan with the patient.   Toni Linton M.D.          HYALINE CASTS NONE SEEN /LPF  0-5   SPECIMEN TYPE      URINE, CLEAN CATCH/MIDSTREAM   MUCUS PRESENT

## 2020-10-03 NOTE — ANESTHESIA PREPROCEDURE EVALUATION
Anesthesia Pre-Procedure Evaluation    Patient: Maria Teresa Benedict   MRN: [id number] : 1970          Preoperative Diagnosis: Appendicitis [K37]    Procedure(s):  APPENDECTOMY, LAPAROSCOPIC    Past Medical History:   Diagnosis Date     LSIL (low grade squamous intraepithelial lesion) on Pap smear     colp- focal koilocytosis     NO ACTIVE PROBLEMS      Shingles 2015     Past Surgical History:   Procedure Laterality Date     C/SECTION, LOW TRANSVERSE  ,     , Low Transverse     HC TOOTH EXTRACTION W/FORCEP      wisdom teeth     TUBAL LIGATION      PPTL     Allergies   Allergen Reactions     Penicillins Hives     Social History     Tobacco Use     Smoking status: Never Smoker     Smokeless tobacco: Never Used   Substance Use Topics     Alcohol use: Yes     Alcohol/week: 0.0 standard drinks     Frequency: 2-3 times a week     Drinks per session: 1 or 2     Binge frequency: Never     Comment: occ     Prior to Admission medications    Medication Sig Start Date End Date Taking? Authorizing Provider   Multiple Vitamins-Minerals (MULTIVITAMIN ADULT PO) Take 1 tablet by mouth daily    Yes Reported, Patient   norgestimate-ethinyl estradiol (ORTHO-CYCLEN) 0.25-35 MG-MCG tablet Take 1 tablet by mouth daily Active tablets daily for prevention of menses 20  Yes Laura Philippe MD     Current Facility-Administered Medications Ordered in Epic   Medication Dose Route Frequency Last Rate Last Dose     [Auto Hold] acetaminophen (TYLENOL) Suppository 650 mg  650 mg Rectal Q4H PRN         [Auto Hold] acetaminophen (TYLENOL) tablet 650 mg  650 mg Oral Q4H PRN         dextrose 5% and 0.45% NaCl + KCl 20 mEq/L infusion   Intravenous Continuous 100 mL/hr at 10/03/20 1718       [Auto Hold] HYDROmorphone (PF) (DILAUDID) injection 0.3 mg  0.3 mg Intravenous Q2H PRN         lactated ringers infusion   Intravenous Continuous         [Auto Hold] lidocaine (LMX4) cream   Topical Q1H PRN          [Auto Hold] lidocaine 1 % 0.1-1 mL  0.1-1 mL Other Q1H PRN         [Auto Hold] naloxone (NARCAN) injection 0.1-0.4 mg  0.1-0.4 mg Intravenous Q2 Min PRN         [Auto Hold] ondansetron (ZOFRAN-ODT) ODT tab 4 mg  4 mg Oral Q6H PRN        Or     [Auto Hold] ondansetron (ZOFRAN) injection 4 mg  4 mg Intravenous Q6H PRN         [Auto Hold] prochlorperazine (COMPAZINE) injection 10 mg  10 mg Intravenous Q6H PRN        Or     [Auto Hold] prochlorperazine (COMPAZINE) tablet 10 mg  10 mg Oral Q6H PRN        Or     [Auto Hold] prochlorperazine (COMPAZINE) suppository 25 mg  25 mg Rectal Q12H PRN         [Auto Hold] sodium chloride (PF) 0.9% PF flush 3 mL  3 mL Intracatheter q1 min prn         [Auto Hold] sodium chloride (PF) 0.9% PF flush 3 mL  3 mL Intracatheter Q8H   3 mL at 10/03/20 1718     No current [facility]-ordered outpatient medications on file.       dextrose 5% and 0.45% NaCl + KCl 20 mEq/L 100 mL/hr at 10/03/20 1718     lactated ringers       Recent Labs   Lab Test 10/03/20  1320 08/09/18  1258    140   POTASSIUM 3.3* 4.5   CHLORIDE 106 104   CO2 24 25   ANIONGAP 8 11   * 84   BUN 11 15   CR 0.82 0.80   AIME 8.8 8.8     Recent Labs   Lab Test 10/03/20  1208 08/09/18  1258   WBC 16.9* 7.5   HGB 12.8 13.2    285     No results for input(s): ABO, RH in the last 62595 hours.  No results for input(s): TROPI in the last 31297 hours.  No results for input(s): PH, PCO2, PO2, HCO3 in the last 51629 hours.  No results for input(s): HCG in the last 60754 hours.  Recent Results (from the past 744 hour(s))   CT Abdomen Pelvis w Contrast    Narrative    CT ABDOMEN AND PELVIS WITH CONTRAST 10/3/2020 2:28 PM    CLINICAL HISTORY: Abdominal pain, acute, generalized; right lower  quadrant and periumbilical.    TECHNIQUE: CT scan of the abdomen and pelvis was performed following  injection of IV contrast. Multiplanar reformats were obtained. Dose  reduction techniques were used.    CONTRAST: 81mL  [id number]    COMPARISON: None.    FINDINGS:   LOWER CHEST: Normal.    HEPATOBILIARY: Normal.    PANCREAS: Normal.    SPLEEN: Normal.    ADRENAL GLANDS: Normal.    KIDNEYS/BLADDER: Mild bilateral hydronephrosis but no obstructing  stone or other etiology can be seen.    BOWEL: Inflamed appendix that is enlarged at the right lower quadrant  lying medial to the cecum. Appendix is 1.2 cm, series 3 image 141, and  there is adjacent edema. No abscess or free air. Trace pelvic fluid.  No bowel obstruction.    PELVIC ORGANS: Normal.    ADDITIONAL FINDINGS: None.    MUSCULOSKELETAL: Normal.      Impression    IMPRESSION:   1.  Acute appendicitis. Enlarged and inflamed appendix lies medial to  the cecum and is 1.2 cm in size. No abscess or free air. Trace fluid  at the appendix and deep pelvis.  2.  Mild bilateral hydronephrosis but no obstructing etiology can be  seen. Correlate with vesicoureteral reflux.    THELMA MORGAN MD     No results found for this or any previous visit (from the past 4320 hour(s)).      RECENT LABS:       Anesthesia Evaluation     .             ROS/MED HX    ENT/Pulmonary:  - neg pulmonary ROS     Neurologic:  - neg neurologic ROS     Cardiovascular:  - neg cardiovascular ROS       METS/Exercise Tolerance:  >4 METS   Hematologic:  - neg hematologic  ROS       Musculoskeletal:         GI/Hepatic:     (+) appendicitis,       Renal/Genitourinary:  - ROS Renal section negative       Endo:  - neg endo ROS       Psychiatric:  - neg psychiatric ROS       Infectious Disease:         Malignancy:         Other: Comment: H/o shingles                          Physical Exam  Normal systems: dental    Airway   Mallampati: I  TM distance: >3 FB  Neck ROM: full    Dental     Cardiovascular   Rhythm and rate: regular and normal      Pulmonary    breath sounds clear to auscultation            Lab Results   Component Value Date    WBC 16.9 (H) 10/03/2020    HGB 12.8 10/03/2020    HCT 39.6 10/03/2020      "10/03/2020     10/03/2020    POTASSIUM 3.3 (L) 10/03/2020    CHLORIDE 106 10/03/2020    CO2 24 10/03/2020    BUN 11 10/03/2020    CR 0.82 10/03/2020     (H) 10/03/2020    AIME 8.8 10/03/2020    ALBUMIN 3.4 10/03/2020    PROTTOTAL 7.5 10/03/2020    ALT 15 10/03/2020    AST 9 10/03/2020    ALKPHOS 81 10/03/2020    BILITOTAL 0.7 10/03/2020    LIPASE 64 (L) 10/03/2020    TSH 1.80 08/09/2018    HCG Positive (A) 10/20/2005       Preop Vitals  BP Readings from Last 3 Encounters:   10/03/20 114/69   10/03/20 117/67   08/31/20 109/70    Pulse Readings from Last 3 Encounters:   10/03/20 106   10/03/20 119   08/31/20 98      Resp Readings from Last 3 Encounters:   10/03/20 16   07/31/15 16   06/23/15 16    SpO2 Readings from Last 3 Encounters:   10/03/20 99%   10/03/20 98%   08/31/20 96%      Temp Readings from Last 1 Encounters:   10/03/20 37.5  C (99.5  F) (Oral)    Ht Readings from Last 1 Encounters:   10/03/20 1.585 m (5' 2.4\")      Wt Readings from Last 1 Encounters:   10/03/20 60.3 kg (133 lb)    Estimated body mass index is 24.02 kg/m  as calculated from the following:    Height as of this encounter: 1.585 m (5' 2.4\").    Weight as of this encounter: 60.3 kg (133 lb).       Anesthesia Plan      History & Physical Review  History and physical reviewed and following examination; no interval change.    ASA Status:  1 .    NPO Status:  > 8 hours    Plan for General (RSI ETT) with Intravenous and Propofol induction. Maintenance will be Balanced.    PONV prophylaxis:  Ondansetron (or other 5HT-3) and Dexamethasone or Solumedrol  Background propofol         Postoperative Care  Postoperative pain management:  IV analgesics.      Consents  Anesthetic plan, risks, benefits and alternatives discussed with:  Patient..                 Cj Ortez MD  "

## 2020-10-03 NOTE — PROGRESS NOTES
"SUBJECTIVE  HPI:  Maria Teresa Benedict is a 50 year old female who presents with the CC of generalized lower sharp abdominal pain about 36 hours ago. Was able to sleep. Next morning (24 hours ago) with loose stools.  No change in urine.  No stools today. Abdomen still achey with some tenderness.  99.5 temp today and yesterday.  No vomiting.  No urinary changes.        Past Medical History:   Diagnosis Date     LSIL (low grade squamous intraepithelial lesion) on Pap smear 2004    colp- focal koilocytosis     NO ACTIVE PROBLEMS      Shingles 6/2015     Current Outpatient Medications   Medication Sig Dispense Refill     Multiple Vitamins-Minerals (MULTIVITAMIN ADULT PO)        norgestimate-ethinyl estradiol (ORTHO-CYCLEN) 0.25-35 MG-MCG tablet Take 1 tablet by mouth daily Active tablets daily for prevention of menses 112 tablet 4     Social History     Tobacco Use     Smoking status: Never Smoker     Smokeless tobacco: Never Used   Substance Use Topics     Alcohol use: Yes     Alcohol/week: 0.0 standard drinks     Frequency: 2-3 times a week     Drinks per session: 1 or 2     Binge frequency: Never     Comment: occ       ROS:  10 point ROS negative except as listed above      OBJECTIVE:  /67   Pulse 119   Temp 98  F (36.7  C) (Oral)   Ht 1.575 m (5' 2\")   Wt 60.3 kg (133 lb)   SpO2 98%   BMI 24.33 kg/m    GENERAL APPEARANCE: healthy, alert and no distress  EYES:  conjunctiva clear  PULM: no labored breathing  NEURO: Normal strength and tone, sensory exam grossly normal,  normal speech and mentation  SKIN: no suspicious lesions or rashes      Results for orders placed or performed during the hospital encounter of 10/03/20   Lactic acid whole blood     Status: None   Result Value Ref [facility]    Lactic Acid 1.7 0.7 - 2.0 mmol/L   Results for orders placed or performed in visit on 10/03/20   *UA reflex to Microscopic and Culture ([place] [facility] (except [place] and [place])     Status: Abnormal "    Specimen: Midstream Urine   Result Value Ref [facility]    Color Urine Yellow     Appearance Urine Clear     Glucose Urine Negative NEG^Negative mg/dL    Bilirubin Urine Small (A) NEG^Negative    Ketones Urine >=80 (A) NEG^Negative mg/dL    Specific Gravity Urine >1.030 1.003 - 1.035    Blood Urine Large (A) NEG^Negative    pH Urine 5.5 5.0 - 7.0 pH    Protein Albumin Urine 30 (A) NEG^Negative mg/dL    Urobilinogen Urine 0.2 0.2 - 1.0 EU/dL    Nitrite Urine Negative NEG^Negative    Leukocyte Esterase Urine Negative NEG^Negative    Source Midstream Urine    CBC with platelets and differential     Status: Abnormal   Result Value Ref [facility]    WBC 16.9 (H) 4.0 - 11.0 10e9/L    RBC Count 4.15 3.8 - 5.2 10e12/L    Hemoglobin 12.8 11.7 - 15.7 g/dL    Hematocrit 39.6 35.0 - 47.0 %    MCV 95 78 - 100 fl    MCH 30.8 26.5 - 33.0 pg    MCHC 32.3 31.5 - 36.5 g/dL    RDW 13.1 10.0 - 15.0 %    Platelet Count 283 150 - 450 10e9/L    Diff Method Automated Method     % Neutrophils 91.7 %    % Lymphocytes 4.3 %    % Monocytes 3.2 %    % Eosinophils 0.7 %    % Basophils 0.1 %    Absolute Neutrophil 15.5 (H) 1.6 - 8.3 10e9/L    Absolute Lymphocytes 0.7 (L) 0.8 - 5.3 10e9/L    Absolute Monocytes 0.5 0.0 - 1.3 10e9/L    Absolute Eosinophils 0.1 0.0 - 0.7 10e9/L    Absolute Basophils 0.0 0.0 - 0.2 10e9/L   Urine Microscopic     Status: Abnormal   Result Value Ref [facility]    WBC Urine 0 - 5 OTO5^0 - 5 /HPF    RBC Urine 2-5 (A) OTO2^O - 2 /HPF    Squamous Epithelial /LPF Urine Moderate (A) FEW^Few /LPF    Bacteria Urine Moderate (A) NEG^Negative /HPF    Mucous Urine Present (A) NEG^Negative /LPF       ASSESSMENT:  (R10.84) Abdominal pain, generalized  (B34.9) Viral syndrome  (primary encounter diagnosis)  (D72.829) Leukocytosis, unspecified type  Comment: Given diff, and tenderness sent to ER for assessment  Plan: *UA reflex to Microscopic and Culture ([facility] (except [place] and         [place]), CBC with  platelets and differential,         Comprehensive metabolic panel (BMP + Alb, Alk         Phos, ALT, AST, Total. Bili, TP), Symptomatic         COVID-19 Virus (Coronavirus) by PCR, Urine         Microscopic        Sent to ED by private vehicle

## 2020-10-03 NOTE — PROGRESS NOTES
RECEIVING UNIT ED HANDOFF REVIEW    ED Nurse Handoff Report was reviewed by: Edita Nunez on October 3, 2020 at 4:27 PM

## 2020-10-03 NOTE — ED PROVIDER NOTES
"  History     Chief Complaint:  Abdominal Pain and Fever      The history is provided by the patient.      Maria Teresa Benedict is an otherwise healthy 50 year old female who presents for evaluation of abdominal pain. The patient began having diffuse abdominal pain while on the toilet two days ago. She developed diarrhea yesterday and had several episodes. She also notes a low grade fever of 99. She reports nausea but denies vomiting. She denies urinary symptoms and blood in the stool. She denies recent travel or antibiotics. She has not been out of the house recently. The only thing different of note is she ate [place] on Thursday that her family did not eat. She last ate applesauce yesterday.     From 10/3/20 Urgent Care Visit:   Laboratory:   CBC: WBC: 16.9(H), HGB: 12.8, PLT: 283  CMP: In process  UA with Microscopic: Bilirubin: small, Ketones: >=80, Blood: large, Protein Albumin: 30, o/w WNL   Urine Culture: In process   Symptomatic COVID-19 Virus by PCR: In process     Allergies:  Penicillins    Medications:    Multiple Vitamins-Minerals  Norgestimate-ethinyl estradiol    Past Medical History:    Shingles     Past Surgical History:     section  Tubal ligation   [place] teeth extraction    Family History:    Blood clots  Colon polyps  Hypertension  Hypothyroidism    Social History:  Marital Status:   [2]  Presents unaccompanied to the ED  PCP: Yanci Becerra CNP  Negative for tobacco use.  Negative for smokeless tobacco use.  Positive for alcohol use.   Negative for drug use.       Review of Systems   All other systems reviewed and are negative.    Physical Exam     Patient Vitals for the past 24 hrs:   BP Temp Temp src Pulse Resp SpO2 Height Weight   10/03/20 1309 (!) 142/75 98.5  F (36.9  C) Oral 116 16 98 % 1.585 m (5' 2.4\") 60.3 kg (133 lb)       Physical Exam   General: Resting on the bed.  Head: No obvious trauma to head.  Ears, Nose, Throat:  External ears normal.  Nose " normal.    Eyes:  Conjunctivae clear.  Pupils are equal, round, and reactive.   Neck: Normal range of motion.  Neck supple.   CV: Regular rate and rhythm.  No murmurs.      Respiratory: Effort normal and breath sounds normal.  No wheezing or crackles.   Gastrointestinal: Soft.  No distension. There is RLQ and periumbilical tenderness.  There is no rigidity, no rebound and no guarding.   Neuro: Alert. Moving all extremities appropriately.  Normal speech.    Skin: Skin is warm and dry.  No rash noted.     Emergency Department Course     Imaging:  Radiology findings were communicated with the patient who voiced understanding of the findings.    CT Abdomen Pelvis w/ IV contrast:   1.  Acute appendicitis. Enlarged and inflamed appendix lies medial to   the cecum and is 1.2 cm in size. No abscess or free air. Trace fluid   at the appendix and deep pelvis.   2.  Mild bilateral hydronephrosis but no obstructing etiology can be   seen. Correlate with vesicoureteral reflux, as per radiology.     Laboratory:  Laboratory findings were communicated with the patient who voiced understanding of the findings.    CMP: Glucose 101(H), Potassium: 3.3(L), o/w WNL (Creatinine: 0.82)    1332 Lactic acid: 1.7   Lipase: 64(L)     Asymptomatic COVID-19 Virus by PCR: In process     Procedures:  None    Interventions:  1323 0.9% sodium chloride bolus, 1,000 ml, IV   1507 Cefotetan, 2 g, IV    Emergency Department Course:  Past medical records, nursing notes, and vitals reviewed.    1350 I performed an exam of the patient as documented above.     IV was inserted and blood was drawn for laboratory testing, results above.  The patient's nose was swabbed and this sample was sent for COVID-19 antigen, findings above.   The patient was sent for an abdomen pelvis CT while in the emergency department, results above.     1454 I rechecked the patient and discussed the results of her workup thus far.   1503 I consulted with Dr. Harris of the hospitalist  services who is in agreement to accept the patient for admission.     Findings and plan explained to the Patient who consents to admission. Discussed the patient with Dr. Harris, who will admit the patient to an observation bed for further monitoring, evaluation, and treatment.    I personally reviewed the laboratory and imaging results with the Patient and answered all related questions prior to admission.     Impression & Plan     CMS Diagnoses:      Covid-19  Maria Teresa Benedict was evaluated during a global COVID-19 pandemic, which necessitated consideration that the patient might be at risk for infection with the SARS-CoV-2 virus that causes COVID-19.   Applicable protocols for evaluation were followed during the patient's care.   COVID-19 was considered as part of the patient's evaluation. The plan for testing is:  a test was obtained during this visit.    Medical Decision Making:  Maria Teresa Benedict is a 50 year old female who presents with abdominal pain concerning for appendicitis.  Broad differentials pursued include not limited to diverticulitis, colitis, appendicitis, UTI, pyelonephritis, electrolyte, metabolic, renal dysfunction, etc.  CBC from outside facility was elevated 16.9.  Hemoglobin stable.  UA shows blood but no other evidence of infection.  CMP shows no acute electrolyte, metabolic or renal dysfunction.  LFTs and lipase are reassuring, not concerning for obstructive biliary process, hepatitis, pancreatitis.  Lactate is normal.  No signs of severe sepsis or septic shock.  CT scan confirms the presence of appendicitis, and there is no evidence of rupture or abscess at this time.  The patient s symptoms have been controlled with interventions in the Emergency Department, and she appears more comfortable on recheck. I discussed the diagnosis with the patient and have answered all questions about the plan of care. Parenteral antibiotics have been ordered .  I discussed the patient with  the on call general surgeon, Dr. Harris, and the patient will be admitted for surgery. she has remained hemodynamically stable in the Emergency Department.        Diagnosis:    ICD-10-CM    1. Acute appendicitis with localized peritonitis, without perforation, abscess, or gangrene  K35.30    2. Bandemia  D72.825        Disposition:  Admitted to an observation bed under the care of Dr. Harris.    Scribe Disclosure:  Abeba PEACE, am serving as a scribe at 1:50 PM on 10/3/2020 to document services personally performed by Geneva Cartagena MD based on my observations and the provider's statements to me.     [facility] EMERGENCY DEPT       Geneva Cartagena MD  10/03/20 [id number]

## 2020-10-03 NOTE — PHARMACY-ADMISSION MEDICATION HISTORY
Pharmacy Medication History  Admission medication history interview status for the 10/3/2020  admission is complete. See EPIC admission navigator for prior to admission medications     Medication history sources: Patient and Surescripts  Medication history source reliability: Good  Adherence assessment: Good      Medication reconciliation completed by provider prior to medication history? No    Time spent in this activity: 5 Minutes       Prior to Admission medications    Medication Sig Last Dose Taking? Auth Provider   Multiple Vitamins-Minerals (MULTIVITAMIN ADULT PO) Take 1 tablet by mouth daily  10/2/2020 at am Yes Reported, Patient   norgestimate-ethinyl estradiol (ORTHO-CYCLEN) 0.25-35 MG-MCG tablet Take 1 tablet by mouth daily Active tablets daily for prevention of menses 10/2/2020 at am Yes Laura Philippe MD Briana Burbach, PharmD, BCCCP

## 2020-10-03 NOTE — ED NOTES
"[facility]  ED Nurse Handoff Report    ED Chief complaint: Abdominal Pain and Fever      ED Diagnosis:   Final diagnoses:   None       Code Status: Full Code    Allergies:   Allergies   Allergen Reactions     Penicillins Hives       Patient Story: Pt reports generalized abdominal pain and diarrhea since Thursday. Pt also reports borderline fever at home.  Focused Assessment:  A&O x4. Denies CP or SOB. C/o generalized abdominal tenderness and diarrhea.     Treatments and/or interventions provided: 1L NS bolus, IV abx  Patient's response to treatments and/or interventions: n/a    To be done/followed up on inpatient unit:  n/a    Does this patient have any cognitive concerns?: none    Activity level - Baseline/Home:  Independent  Activity Level - Current:   Independent    Patient's Preferred language: English   Needed?: No    Isolation: None  Infection: Not Applicable  Bariatric?: No    Vital Signs:   Vitals:    10/03/20 1309   BP: (!) 142/75   Pulse: 116   Resp: 16   Temp: 98.5  F (36.9  C)   TempSrc: Oral   SpO2: 98%   Weight: 60.3 kg (133 lb)   Height: 1.585 m (5' 2.4\")       Cardiac Rhythm:     Was the PSS-3 completed:   Yes  What interventions are required if any?               Family Comments: n/a  OBS brochure/video discussed/provided to patient/family: No              Name of person given brochure if not patient: n/a              Relationship to patient: n/a    For the majority of the shift this patient's behavior was Green.   Behavioral interventions performed were n/a.    ED NURSE PHONE NUMBER: *[phone]         "

## 2020-10-04 DIAGNOSIS — K35.30 ACUTE APPENDICITIS WITH LOCALIZED PERITONITIS, WITHOUT PERFORATION OR ABSCESS, UNSPECIFIED WHETHER GANGRENE PRESENT: Primary | ICD-10-CM

## 2020-10-04 LAB
ALBUMIN SERPL-MCNC: 3.1 G/DL (ref 3.4–5)
ALP SERPL-CCNC: 76 U/L (ref 40–150)
ALT SERPL W P-5'-P-CCNC: 17 U/L (ref 0–50)
ANION GAP SERPL CALCULATED.3IONS-SCNC: 10 MMOL/L (ref 3–14)
AST SERPL W P-5'-P-CCNC: 11 U/L (ref 0–45)
BILIRUB SERPL-MCNC: 0.7 MG/DL (ref 0.2–1.3)
BUN SERPL-MCNC: 12 MG/DL (ref 7–30)
CALCIUM SERPL-MCNC: 8.9 MG/DL (ref 8.5–10.1)
CHLORIDE SERPL-SCNC: 106 MMOL/L (ref 94–109)
CO2 SERPL-SCNC: 20 MMOL/L (ref 20–32)
CREAT SERPL-MCNC: 0.81 MG/DL (ref 0.52–1.04)
GFR SERPL CREATININE-BSD FRML MDRD: 86 ML/MIN/{1.73_M2}
GLUCOSE SERPL-MCNC: 99 MG/DL (ref 70–99)
POTASSIUM SERPL-SCNC: 3.9 MMOL/L (ref 3.4–5.3)
PROT SERPL-MCNC: 7.3 G/DL (ref 6.8–8.8)
SODIUM SERPL-SCNC: 136 MMOL/L (ref 133–144)

## 2020-10-04 RX ORDER — CLINDAMYCIN HCL 300 MG
300 CAPSULE ORAL 4 TIMES DAILY
Qty: 12 CAPSULE | Refills: 0 | Status: SHIPPED | OUTPATIENT
Start: 2020-10-04 | End: 2020-10-07

## 2020-10-04 NOTE — ANESTHESIA POSTPROCEDURE EVALUATION
Patient: Maria Teresa Benedict    Procedure(s):  APPENDECTOMY, LAPAROSCOPIC    Diagnosis:Appendicitis [K37]  Diagnosis Additional Information: No value filed.    Anesthesia Type:  General    Note:  Anesthesia Post Evaluation    Patient location during evaluation: PACU  Patient participation: Able to fully participate in evaluation  Level of consciousness: sleepy but conscious and responsive to verbal stimuli  Pain management: adequate  Airway patency: patent  Cardiovascular status: acceptable and hemodynamically stable  Respiratory status: acceptable and unassisted  Hydration status: acceptable  PONV: none     Anesthetic complications: None          Last vitals:  Vitals:    10/03/20 2100 10/03/20 2115 10/03/20 2130   BP: 126/69 125/75    Pulse: 91 89 90   Resp: 16 13 8   Temp: 37.3  C (99.1  F) 36.5  C (97.7  F) 35.1  C (95.2  F)   SpO2: 96% 96% 96%         Electronically Signed By: Cj Ortez MD  October 4, 2020  5:09 AM

## 2020-10-04 NOTE — BRIEF OP NOTE
[facility]    Brief Operative Note    Pre-operative diagnosis: Appendicitis [K37]  Post-operative diagnosis Same as pre-operative diagnosis    Procedure: Procedure(s):  APPENDECTOMY, LAPAROSCOPIC  Surgeon: Surgeon(s) and Role:     * Toni Linton MD - Primary     * Sudhakar Moon PA-C - Assisting  Anesthesia: General   Estimated blood loss: 5cc  Drains: None  Specimens:   ID Type Source Tests Collected by Time Destination   A : APPENDIX Tissue Appendix SURGICAL PATHOLOGY EXAM Toni Linton MD 10/3/2020  7:45 PM      Findings:   Appendix inflamed with adhesions to omentum and right fallopian tube .  Complications: None.  Implants: * No implants in log *    Sudhakar Moon PA-C  Office: [phone]  Pager: [phone]

## 2020-10-04 NOTE — OR NURSING
PNDS Discharge Criteria met.  Dr Ortez here to assess Mrs. Benedict; order received to allow discharge to home.  Discharge instructions reviewed with Eva Benedict, and with her  [place] by telephone per the Covid prevention procedure; both verbalize understanding of instructions.  Will discharge to home with written copy of AVS, written Rxs for Norco, Cipro, Flagyl and Senna, and cold packs.  Awaiting arrival of  for discharge.

## 2020-10-04 NOTE — DISCHARGE INSTRUCTIONS
Today you received Toradol, an antiinflammatory medication similar to Ibuprofen.  You should not take other antiinflammatory medication, such as Ibuprofen, Motrin, Advil, Aleve, Naprosyn, etc until 2:00 am tonight.         Same Day Surgery Discharge Instructions for  Sedation and General Anesthesia       It's not unusual to feel dizzy, light-headed or faint for up to 24 hours after surgery or while taking pain medication.  If you have these symptoms: sit for a few minutes before standing and have someone assist you when you get up to walk or use the bathroom.      You should rest and relax for the next 24 hours. We recommend you make arrangements to have an adult stay with you for at least 24 hours after your discharge.  Avoid hazardous and strenuous activity.      DO NOT DRIVE any vehicle or operate mechanical equipment for 24 hours following the end of your surgery.  Even though you may feel normal, your reactions may be affected by the medication you have received.      Do not drink alcoholic beverages for 24 hours following surgery.       Slowly progress to your regular diet as you feel able. It's not unusual to feel nauseated and/or vomit after receiving anesthesia.  If you develop these symptoms, drink clear liquids (apple juice, ginger ale, broth, 7-up, etc. ) until you feel better.  If your nausea and vomiting persists for 24 hours, please notify your surgeon.        All narcotic pain medications, along with inactivity and anesthesia, can cause constipation. Drinking plenty of liquids and increasing fiber intake will help.      For any questions of a medical nature, call your surgeon.      Do not make important decisions for 24 hours.      If you had general anesthesia, you may have a sore throat for a couple of days related to the breathing tube used during surgery.  You may use Cepacol lozenges to help with this discomfort.  If it worsens or if you develop a fever, contact your surgeon.       If you feel  your pain is not well managed with the pain medications prescribed by your surgeon, please contact your surgeon's office to let them know so they can address your concerns.           CoVid 19 Information    We want to give you information regarding Covid. Please consult your primary care provider with any questions you might have.     Patient who have symptoms (cough, fever, or shortness of breath), need to isolate for 7 days from when symptoms started OR 72 hours after fever resolves (without fever reducing medications) AND improvement of respiratory symptoms (whichever is longer).      Isolate yourself at home (in own room/own bathroom if possible)    Do Not allow any visitors    Do Not go to work or school    Do Not go to [employer],  centers, shopping, or other public places.    Do Not shake hands.    Avoid close and intimate contact with others (hugging, kissing).    Follow CDC recommendations for household cleaning of frequently touched services.     After the initial 7 days, continue to isolate yourself from household members as much as possible. To continue decrease the risk of community spread and exposure, you and any members of your household should limit activities in public for 14 days after starting home isolation.     You can reference the following CDC link for helpful home isolation/care tips:  https://www.cdc.gov/coronavirus/2019-ncov/downloads/10Things.pdf    Protect Others:    Cover Your Mouth and Nose with a mask, disposable tissue or wash cloth to avoid spreading germs to others.    Wash your hands and face frequently with soap and water    Call Your Primary Doctor If: Breathing difficulty develops or you become worse.    For more information about COVID19 and options for caring for yourself at home, please visit the CDC website at https://www.cdc.gov/coronavirus/2019-ncov/about/steps-when-sick.html  For more options for care at [facility], please visit our website at  [email or web address]            [facility]  Discharge Instructions: Post-Operative Laparoscopic Appendectomy    ACTIVITY    Expect to feel tired after your surgery.  This will gradually resolve.      Take frequent, short walks and increase your activity gradually.      Avoid strenuous physical activity or heavy lifting greater than 15-20 lbs. for 2-3 weeks.  You may climb stairs.    You may drive without restrictions when you are not using any prescription pain medication and feel comfortable in a car.    You may return to work/school when you are comfortable without any prescription pain medication.    WOUND CARE    You may remove your outer dressing or Band-Aids and shower 48 hours after the surgery.  Pat your incisions dry and leave them open to air.  Re-apply dressing (Band-Aids or gauze/tape) as needed for comfort or drainage.    You may have steri-strips (looks like white tape) on your incision.  You may peel off the steri-strips 2 weeks after your surgery if they have not peeled off on their own.     Do not soak your incisions in a tub or pool for 2 weeks.     Do not apply any lotions, creams, or ointments to your incisions.    A ridge under your incisions is normal and will gradually resolve.    DIET    Start with liquids, then gradually resume your regular diet as tolerated.  Avoid heavy, spicy, and greasy meals for 2-3 days.    Drink plenty of fluids to stay hydrated.    PAIN    Expect some tenderness and discomfort at the incision sites.  Use the prescribed pain medication at your discretion.  Expect gradual resolution of your pain over several days.    You may take ibuprofen with food (unless you have been told not to) instead of or in addition to your prescribed pain medication.  If you are taking Norco or Percocet, do not take any additional acetaminophen/APAP/Tylenol.    Do not drink alcohol or drive while you are taking pain  medications.    You may apply ice to your incisions in 20 minute intervals as needed for the next 48 hours.  After that time, consider switching to heat if you prefer.    EXPECTATIONS    Pain medications can cause constipation.  Limit use when possible.  Take over the counter stool softener/stimulant, such as Colace or Senna, 1-2 times a day with plenty of water.  You may take a mild over the counter laxative, such as Miralax or a suppository, as needed.  You make discontinue these medications once you are having regular bowel movements and/or are no longer taking your narcotic pain medication.     You may have shoulder or upper back discomfort due to the gas used in surgery.  This is temporary and should resolve in 48-72 hours.  Short, frequent walks may help with this.    FOLLOW UP    Our office will contact you approximately 2 weeks to check on your progress and answer any questions you may have.  If you are doing well, you will not need to return for a follow up appointment.  If any concerns are identified over the phone, we will help you make an appointment to see a provider.     If you have not received a phone call, have any questions or concerns, or would like to be seen, please call us at [phone] and ask to speak with our nurse.  We are located at [address] [place] [address].    CALL OUR OFFICE -[phone] IF YOU HAVE:     Chills or fever above 101 F.    Increased redness, warmth, or drainage at your incisions.    Significant bleeding.    Pain not relieved by your pain medication or rest.    Increasing pain after the first 48 hours.    Any other concerns or questions.    Revised January 2018        **If you have questions or concerns about your procedure,   call Dr Linton at  [phone]**

## 2020-10-04 NOTE — OP NOTE
PREOPERATIVE DIAGNOSIS:  Acute appendicitis    POSTOPERATIVE DIAGNOSIS:  Acute appendicitis, purulent, without rupture    PROCEDURE:   Procedure(s):  APPENDECTOMY, LAPAROSCOPIC    ANESTHESIA:  General.      SURGEON:  Toni Linton MD    ASSISTANT:  Sudhakar Moon PA-C The physician assistant was present for the entirety of the operation. Their assistance was medically necessary for camera management, retraction, and suctioning.     INDICATIONS:  Appendicitis    PROCEDURE:  Patient was taken to the operating suite and uneventfully endotracheally intubated.  Abdomen was prepped and draped in a sterile fashion.  Surgeon initiated timeout was acknowledged.  A small skin incision was made in the infraumbilical area and the abdomen was insufflated with a veress needle. A 5mm trocar was placed. No injury was seen when the camera was placed.  Two other trocars were placed in the usual positions under laparoscopic visualization.  Using 2 long graspers, we were able to identify the cecum and the appendix was identified near the ileocecal valve.  The appendix was inflamed and hyperemic but not ruptured.   We were able to grasp the appendix and elevate it up toward the anterior abdominal wall. I carefully dissected away adherent omentum and the right tube. Using a combination of sharp and blunt dissection, we were able to create a window between the appendix and the mesoappendix near its base.  We then fired a 45 mm blue load  stapler across the appendix at its base.  This appeared to be intact.  Following this, we fired a 45 mm white load across the mesoappendix, freeing the appendix from the cecum.  Appendix was placed in an Endocatch bag and removed from the abdomen.  We again inspected our staple lines and these were all found to be intact and hemostatic. We irrigated thoroughly. Hemostasis was good, and all debris was suctioned out.  I removed the larger trocar and reapproximated the fascia with a  0 Vicryl suture.  We then  desufflated the abdomen using the suction  and the trocars were removed.  The skin edges were reapproximated using 4-0 Vicryl and Steri-Strips.  Band-Aids were placed and the patient was awakened.  The patient was uneventfully extubated and taken to PACU in stable condition.  At the conclusion of the case, all  counts were correct.            INTRAOPERATIVE FINDINGS:  Acute appendicitis, without rupture    Toni Linton MD

## 2020-10-05 ENCOUNTER — TELEPHONE (OUTPATIENT)
Dept: SURGERY | Facility: CLINIC | Age: 50
End: 2020-10-05

## 2020-10-05 DIAGNOSIS — B37.0 ORAL THRUSH: Primary | ICD-10-CM

## 2020-10-05 RX ORDER — NYSTATIN 100000/ML
500000 SUSPENSION, ORAL (FINAL DOSE FORM) ORAL 4 TIMES DAILY
Qty: 140 ML | Refills: 0 | Status: SHIPPED | OUTPATIENT
Start: 2020-10-05 | End: 2020-10-12

## 2020-10-05 NOTE — TELEPHONE ENCOUNTER
"Procedure:  Laparoscopic appendectomy    Date:  10/03/2020    Surgeon:  Royer    Patient reporting oral thrush. Reporting white splotches as well as \"yellow\" tongue.    No other concerns regarding the surgery. She is not having any pain and has not had to take any norco.  Using tylenol sparingly.    She did call and talk to Dr. Harris yesterday because she was having a reaction to either the cipro or the flagyl. Unsure of which one and does not want either added to allergy list at this time. Dr. Harris switched her to clindamycin and she has been doing well on this, other than the development of thrush.    Will send rx for nystatin to patient's pharmacy.    Advised her to contact PCP if symptoms do not resolve as they have more experience with these types of symptoms/dx.  She agreed.    She will call PRN.    Ileana Alvarez RN-BSN    "
Is This A New Presentation, Or A Follow-Up?: Follow Up Cyst

## 2020-10-06 LAB — COPATH REPORT: NORMAL

## 2020-10-12 ENCOUNTER — MYC MEDICAL ADVICE (OUTPATIENT)
Dept: FAMILY MEDICINE | Facility: CLINIC | Age: 50
End: 2020-10-12

## 2020-10-13 ENCOUNTER — VIRTUAL VISIT (OUTPATIENT)
Dept: FAMILY MEDICINE | Facility: CLINIC | Age: 50
End: 2020-10-13
Payer: OTHER GOVERNMENT

## 2020-10-13 DIAGNOSIS — B37.0 THRUSH: Primary | ICD-10-CM

## 2020-10-13 DIAGNOSIS — Z88.0 PENICILLIN ALLERGY: ICD-10-CM

## 2020-10-13 PROCEDURE — 99213 OFFICE O/P EST LOW 20 MIN: CPT | Mod: 95 | Performed by: NURSE PRACTITIONER

## 2020-10-13 RX ORDER — CLOTRIMAZOLE 10 MG/1
LOZENGE ORAL
Qty: 25 LOZENGE | Refills: 0 | Status: SHIPPED | OUTPATIENT
Start: 2020-10-13 | End: 2021-03-02

## 2020-10-13 NOTE — TELEPHONE ENCOUNTER
With her recent surgery, please ask her to do a virtual visit with me today.  I have same day hold slots available and could do the telephone call/video visit earlier if she would like, just clarify what time (not over lunch hour).    Thank you.

## 2020-10-13 NOTE — TELEPHONE ENCOUNTER
Patient answered on house phone.  Video appointment scheduled for later this morning with Yanci Smith RN

## 2020-10-13 NOTE — TELEPHONE ENCOUNTER
Writer called patient's mobile number: Left message to call back and ask to speak with an available triage nurse.    Writer called patient's home number: Left message to call back and ask to speak with an available triage nurse.    MyChart response sent to patient.    MARY EspositoN, RN

## 2020-10-13 NOTE — PROGRESS NOTES
"Maria Teresa Benedict is a 50 year old female who is being evaluated via a billable video visit.      The patient has been notified of following:     \"This video visit will be conducted via a call between you and your physician/provider. We have found that certain health care needs can be provided without the need for an in-person physical exam.  This service lets us provide the care you need with a video conversation.  If a prescription is necessary we can send it directly to your pharmacy.  If lab work is needed we can place an order for that and you can then stop by our lab to have the test done at a later time.    Video visits are billed at different rates depending on your insurance coverage.  Please reach out to your insurance provider with any questions.    If during the course of the call the physician/provider feels a video visit is not appropriate, you will not be charged for this service.\"    Patient has given verbal consent for Video visit? Yes  How would you like to obtain your AVS? MyChart  If you are dropped from the video visit, the video invite should be resent to: Send to e-mail at: [email or web address]  Will anyone else be joining your video visit? No    Subjective     Maria Teresa Benedict is a 50 year old female who presents today via video visit for the following health issues:    HPI      Chief Complaint   Patient presents with     Mouth/Lip Problem     oral thrush     Approximately 10 days ago Maria Teresa had an emergency appendectomy.  She was hospitalied overniht.  She was on antibiotics--reaction with swollen lips, flushed face, etc.  She stopped that antibiotic.  The next day, she started new antibiotics and that went better.  The antibiotic that she took the whole time was clindamycin.  Last dose was 10/7/2020.      Thrush:  Started right away with the cipro and flagyl.  First symptoms 10/5/2020.  She used nystatin oral for a week.  Her thrush may be improved, but it is not gone.  Her " "tongue still has a white coating.  Today she denies tongue pain, numbness/tingling, etc.  Today she is wondering if she can have more medication for thrush.     Maria Teresa is eating and drinking normally.  No fever, chills.   No pain.  She does not need to go back to the surgeon for follow up.  No constipation, diarrhea. Bowel movements are normal.        The antibiotic combo that caused her allergic reaction was ciprofloxacin and flagyl.  She is uncertain which antibiotic caused the reaction.  She also has a history of a penicillin allergy.  She is wondering if she can be tested for antibiotic allergies to make sure her allergies are documented correctly.       Video Start Time: 10:39 AM        Review of Systems   Constitutional, HEENT, cardiovascular, pulmonary, GI, , musculoskeletal, neuro, skin, endocrine and psych systems are negative, except as otherwise noted.      Objective    Vitals - Patient Reported  Weight (Patient Reported): 60.3 kg (133 lb)  Height (Patient Reported): 158.5 cm (5' 2.4\")  BMI (Based on Pt Reported Ht/Wt): 24.01      Vitals:  No vitals were obtained today due to virtual visit.    Physical Exam     GENERAL: Healthy, alert and no distress  EYES: Eyes grossly normal to inspection.  No discharge or erythema, or obvious scleral/conjunctival abnormalities.  HENT: oral mucous membranes moist and oropharynx/tongue with thin white coating and scattered white patches on mucous membranes  RESP: No audible wheeze, cough, or visible cyanosis.  No visible retractions or increased work of breathing.    SKIN: Visible skin clear. No significant rash, abnormal pigmentation or lesions.  NEURO: Cranial nerves grossly intact.  Mentation and speech appropriate for age.  PSYCH: Mentation appears normal, affect normal/bright, judgement and insight intact, normal speech and appearance well-groomed.      Diagnostics:  Reviewed in Epic        Assessment & Plan     (B37.0) Thrush  (primary encounter " diagnosis)  Comment: Acute  Plan: clotrimazole (MYCELEX) 10 MG lozenge        I did tell her today that I think treating for oral thrush is an appropriate request.  She does have signs of thrush on her tongue which is visible even through this video visit.  She will use the Chlortrimazole oral lozenges as prescribed.  I did tell her that after the 5 days of treatment if she has any lingering thrush, as long as it is low-grade, her body may cleared away.  Otherwise if she has significant thrush she is to do a recheck appointment.  She is appreciative.    (Z88.0) Penicillin allergy  Comment: Uncertain  Plan: ALLERGY/ASTHMA ADULT REFERRAL        Due to her allergy to penicillin and now a recent reaction to Cipro and Flagyl, the patient wants to make sure that her allergies are appropriate we documented in her chart.  She is requesting allergy treatment to make sure that either Cipro or Flagyl, f these are what she is allergic to, what they are documented correctly  She states she will follow up with a allergy appointment ASAP.  In the meantime, I told the patient that I did not document a Cipro or Flagyl allergy.  She states she will make sure that the allergist documents these and she will let me know when I see her again/update.          See Patient Instructions    Return in about 1 week (around 10/20/2020), or if symptoms worsen or fail to improve.    RAMON Brush CNP  [facility]      Video-Visit Details    Type of service:  Video Visit    Video End Time:10:56 AM    Originating Location (pt. Location): Home    Distant Location (provider location):  [facility]     Platform used for Video Visit: Marshall

## 2020-10-15 ENCOUNTER — MYC MEDICAL ADVICE (OUTPATIENT)
Dept: FAMILY MEDICINE | Facility: CLINIC | Age: 50
End: 2020-10-15

## 2020-10-15 NOTE — TELEPHONE ENCOUNTER
Yanci Becerra CNP   See pt msg  Poss reaction to antifungal lozenges  Plan b? Nystatin swish and swallow?  I loaded pharmacy    Thanks!     Rain Doran RN

## 2020-10-22 ENCOUNTER — ALLIED HEALTH/NURSE VISIT (OUTPATIENT)
Dept: NURSING | Facility: CLINIC | Age: 50
End: 2020-10-22
Payer: OTHER GOVERNMENT

## 2020-10-22 DIAGNOSIS — Z23 NEED FOR PROPHYLACTIC VACCINATION AND INOCULATION AGAINST INFLUENZA: Primary | ICD-10-CM

## 2020-10-22 PROCEDURE — 90471 IMMUNIZATION ADMIN: CPT

## 2020-10-22 PROCEDURE — 90686 IIV4 VACC NO PRSV 0.5 ML IM: CPT

## 2020-11-03 ENCOUNTER — VIRTUAL VISIT (OUTPATIENT)
Dept: FAMILY MEDICINE | Facility: CLINIC | Age: 50
End: 2020-11-03
Payer: OTHER GOVERNMENT

## 2020-11-03 DIAGNOSIS — B37.0 THRUSH: Primary | ICD-10-CM

## 2020-11-03 PROCEDURE — 99213 OFFICE O/P EST LOW 20 MIN: CPT | Mod: 95 | Performed by: FAMILY MEDICINE

## 2020-11-03 RX ORDER — NYSTATIN 100000/ML
500000 SUSPENSION, ORAL (FINAL DOSE FORM) ORAL 4 TIMES DAILY
Qty: 200 ML | Refills: 0 | Status: SHIPPED | OUTPATIENT
Start: 2020-11-03 | End: 2020-11-13

## 2020-11-03 NOTE — PROGRESS NOTES
"Maria Teresa Benedict is a 50 year old female who is being evaluated via a billable video visit.      The patient has been notified of following:     \"This video visit will be conducted via a call between you and your physician/provider. We have found that certain health care needs can be provided without the need for an in-person physical exam.  This service lets us provide the care you need with a video conversation.  If a prescription is necessary we can send it directly to your pharmacy.  If lab work is needed we can place an order for that and you can then stop by our lab to have the test done at a later time.    Video visits are billed at different rates depending on your insurance coverage.  Please reach out to your insurance provider with any questions.    If during the course of the call the physician/provider feels a video visit is not appropriate, you will not be charged for this service.\"    Patient has given verbal consent for Video visit? Yes  How would you like to obtain your AVS? MyChart  If you are dropped from the video visit, the video invite should be resent to: Text to cell phone: [phone]  Will anyone else be joining your video visit? No    Subjective     Maria Teresa Benedict is a 50 year old female who presents today via video visit for the following health issues:    HPI   oral problem      S/p appendectomy and had bad reactions to abx and developed oral thrush. She took nystatin for 7 days without improvement of symptoms. Pt was then prescribed Clotrimazole. After 4 doses pt developed s/e including skin issues and stopped med. Feels that thrush is getting worse. She is not currently on abx.      Video Start Time: 1:51 pm       Review of Systems   Constitutional, HEENT, cardiovascular, pulmonary, gi and gu systems are negative, except as otherwise noted.      Objective           Vitals:  No vitals were obtained today due to virtual visit.    Physical Exam     GENERAL: Healthy, alert " and no distress  EYES: Eyes grossly normal to inspection.  No discharge or erythema, or obvious scleral/conjunctival abnormalities.  RESP: No audible wheeze, cough, or visible cyanosis.  No visible retractions or increased work of breathing.    SKIN: Visible skin clear. No significant rash, abnormal pigmentation or lesions.  NEURO: Cranial nerves grossly intact.  Mentation and speech appropriate for age.  PSYCH: Mentation appears normal, affect normal/bright, judgement and insight intact, normal speech and appearance well-groomed.      No results found for this or any previous visit (from the past 24 hour(s)).        1. Thrush  - recommended another trial of nystatin for 7-10 days   - nystatin (MYCOSTATIN) 248531 UNIT/ML suspension; Take 5 mLs (500,000 Units) by mouth 4 times daily for 10 days  Dispense: 200 mL; Refill: 0  - pt will send me my chart message if symptoms are not improving by 11/11 - 11/12      Video-Visit Details    Type of service:  Video Visit    Video End Time:1:57 pm    Originating Location (pt. Location): Home    Distant Location (provider location):  [facility]     Platform used for Video Visit: [place]

## 2021-02-24 ENCOUNTER — MYC MEDICAL ADVICE (OUTPATIENT)
Dept: FAMILY MEDICINE | Facility: CLINIC | Age: 51
End: 2021-02-24

## 2021-02-24 NOTE — TELEPHONE ENCOUNTER
Yanci-Please review patient's message.  Would you prefer in-clinic visit for follow up/recheck of UA?    No Urology referral found in chart.    Thank you!  MARY EspositoN, RN  [facility]

## 2021-02-25 NOTE — TELEPHONE ENCOUNTER
Staff--please put Maria Teresa on my schedule Tuesday, 3/2/2021 for a hematuria follow up and let me know when this is done. I will then confirm to her.    Thank you!

## 2021-02-25 NOTE — TELEPHONE ENCOUNTER
Yanci-Patient is on your schedule for 3/2/21 at 1500.    Thank you!  MARY EspositoN, RN  [facility]

## 2021-03-02 ENCOUNTER — OFFICE VISIT (OUTPATIENT)
Dept: FAMILY MEDICINE | Facility: CLINIC | Age: 51
End: 2021-03-02
Payer: OTHER GOVERNMENT

## 2021-03-02 VITALS
WEIGHT: 126 LBS | HEART RATE: 71 BPM | OXYGEN SATURATION: 99 % | TEMPERATURE: 97.6 F | SYSTOLIC BLOOD PRESSURE: 114 MMHG | DIASTOLIC BLOOD PRESSURE: 62 MMHG | BODY MASS INDEX: 23.19 KG/M2 | HEIGHT: 62 IN | RESPIRATION RATE: 16 BRPM

## 2021-03-02 DIAGNOSIS — R31.29 MICROSCOPIC HEMATURIA: Primary | ICD-10-CM

## 2021-03-02 LAB
ALBUMIN UR-MCNC: NEGATIVE MG/DL
APPEARANCE UR: CLEAR
BILIRUB UR QL STRIP: NEGATIVE
COLOR UR AUTO: YELLOW
GLUCOSE UR STRIP-MCNC: NEGATIVE MG/DL
HGB UR QL STRIP: ABNORMAL
KETONES UR STRIP-MCNC: NEGATIVE MG/DL
LEUKOCYTE ESTERASE UR QL STRIP: NEGATIVE
NITRATE UR QL: NEGATIVE
NON-SQ EPI CELLS #/AREA URNS LPF: ABNORMAL /LPF
PH UR STRIP: 5.5 PH (ref 5–7)
RBC #/AREA URNS AUTO: ABNORMAL /HPF
SOURCE: ABNORMAL
SP GR UR STRIP: >1.03 (ref 1–1.03)
UROBILINOGEN UR STRIP-ACNC: 0.2 EU/DL (ref 0.2–1)
WBC #/AREA URNS AUTO: ABNORMAL /HPF

## 2021-03-02 PROCEDURE — 81001 URINALYSIS AUTO W/SCOPE: CPT | Performed by: NURSE PRACTITIONER

## 2021-03-02 PROCEDURE — 99213 OFFICE O/P EST LOW 20 MIN: CPT | Performed by: NURSE PRACTITIONER

## 2021-03-02 ASSESSMENT — MIFFLIN-ST. JEOR: SCORE: 1154.78

## 2021-03-03 NOTE — RESULT ENCOUNTER NOTE
Daina Morel,    Here is your finalized urinalysis result. I do see that you have an appointment scheduled with Dr. Petty for follow up. That is excellent.  Let me know if you have any question.    Yanci NAVARRO CNP

## 2021-03-17 ENCOUNTER — VIRTUAL VISIT (OUTPATIENT)
Dept: UROLOGY | Facility: CLINIC | Age: 51
End: 2021-03-17
Attending: NURSE PRACTITIONER
Payer: OTHER GOVERNMENT

## 2021-03-17 VITALS — HEIGHT: 62 IN | BODY MASS INDEX: 23.92 KG/M2 | WEIGHT: 130 LBS

## 2021-03-17 DIAGNOSIS — R31.29 MICROSCOPIC HEMATURIA: ICD-10-CM

## 2021-03-17 PROCEDURE — 99203 OFFICE O/P NEW LOW 30 MIN: CPT | Mod: 95 | Performed by: UROLOGY

## 2021-03-17 ASSESSMENT — MIFFLIN-ST. JEOR: SCORE: 1172.93

## 2021-03-17 ASSESSMENT — PAIN SCALES - GENERAL: PAINLEVEL: NO PAIN (0)

## 2021-03-17 NOTE — PROGRESS NOTES
*SEND LINK TO EMAIL INBOX*  EMAIL BELOW...    Maria Teresa is a 50 year old who is being evaluated via a billable video visit.      How would you like to obtain your AVS? Jinhargerson  If the video visit is dropped, the invitation should be resent by: Send to e-mail at: [email or web address]  Will anyone else be joining your video visit? No      Video Start Time: 1:53 PM    Assessment & Plan     Microscopic hematuria  She had a urinalysis with microscopic hematuria the time of appendicitis, could have been related to the inflammation.  She also has some bilateral hydro.  Will start with repeating a US.      Her most recent urinalysis in does not show microscopic hematuria and she does not have any risk factors so we discussed options and she wishes to observe so will repeat a urinalysis in about 6 months    - US Renal Complete; Future   - Routine UA with micro reflex to culture; Future    Return in about 6 months (around 9/17/2021) for using a video visit.    Linette Petty MD MPH  (she/her/hers)   of Urology  [facility]  CC  Patient Care Team:  Joce Cam APRN CNP as PCP - General (Nurse Practitioner)  Laura Philippe MD as Assigned OBGYN Provider  Joce Cam APRN CNP as Assigned PCP  JOCE CAM   Maria Teresa is a 50 year old who presents for the following health issues     HPI     She is here today for evaluation of microscopic hematuria    Denies gross hematuria, UTI.  She denies bladder/pelvic surgery or family history of  disease or malignancy     at [facility]    Her urinalysis from March was reviewed and there is small blood on the dip but the microscopic urinalysis shows 0-2 RBC    She has a microscopic urinalysis with blood in October but this is when she also had appendicitis and there was mild bilateral hydronephrosis at that time.  She has otherwise never had any urinary issues    She is not a smoker, has not  worked around chemicals and denies any known family history of  disease or malignancy    Past Medical History:   Diagnosis Date     LSIL (low grade squamous intraepithelial lesion) on Pap smear     colp- focal koilocytosis     NO ACTIVE PROBLEMS      Shingles 2015     Past Surgical History:   Procedure Laterality Date     C/SECTION, LOW TRANSVERSE  ,     , Low Transverse     HC TOOTH EXTRACTION W/FORCEP      wisdom teeth     LAPAROSCOPIC APPENDECTOMY N/A 10/3/2020    Procedure: APPENDECTOMY, LAPAROSCOPIC;  Surgeon: Toni Linton MD;  Location: [facility] OR     TUBAL LIGATION      PPTL     Social History     Socioeconomic History     Marital status:      Spouse name: Oni Benedict     Number of children: 2     Years of education: 19     Highest education level: Not on file   Occupational History     Occupation: assistant prof     Comment: geography     Employer: [employer]   Social Needs     Financial resource strain: Not on file     Food insecurity     Worry: Not on file     Inability: Not on file     Transportation needs     Medical: Not on file     Non-medical: Not on file   Tobacco Use     Smoking status: Never Smoker     Smokeless tobacco: Never Used   Substance and Sexual Activity     Alcohol use: Yes     Alcohol/week: 0.0 standard drinks     Frequency: 2-3 times a week     Drinks per session: 1 or 2     Binge frequency: Never     Comment: occ     Drug use: No     Sexual activity: Not Currently     Partners: Male     Birth control/protection: Pill, Female Surgical     Comment: tubal ligation   Lifestyle     Physical activity     Days per week: Not on file     Minutes per session: Not on file     Stress: Not on file   Relationships     Social connections     Talks on phone: Not on file     Gets together: Not on file     Attends [employer] service: Not on file     Active member of club or organization: Not on file     Attends meetings of clubs or organizations: Not on file      Relationship status: Not on file     Intimate partner violence     Fear of current or ex partner: Not on file     Emotionally abused: Not on file     Physically abused: Not on file     Forced sexual activity: Not on file   Other Topics Concern     Parent/sibling w/ CABG, MI or angioplasty before 65F 55M? No   Social History Narrative          Current Outpatient Medications   Medication     Multiple Vitamins-Minerals (MULTIVITAMIN ADULT PO)     norgestimate-ethinyl estradiol (ORTHO-CYCLEN) 0.25-35 MG-MCG tablet     No current facility-administered medications for this visit.         Allergies   Allergen Reactions     Penicillins Hives           Objective         Vitals:  No vitals were obtained today due to virtual visit.    Physical Exam   GENERAL: Healthy, alert and no distress  EYES: Eyes grossly normal to inspection.  No discharge or erythema, or obvious scleral/conjunctival abnormalities.  RESP: No audible wheeze, cough, or visible cyanosis.  No visible retractions or increased work of breathing.    SKIN: Visible skin clear. No significant rash, abnormal pigmentation or lesions.  NEURO: Cranial nerves grossly intact.  Mentation and speech appropriate for age.  PSYCH: Mentation appears normal, affect normal/bright, judgement and insight intact, normal speech and appearance well-groomed      Video-Visit Details    Type of service:  Video Visit    Video End Time:2:07 PM    Originating Location (pt. Location): Home    Distant Location (provider location):  [facility] [place]     Platform used for Video Visit: [facility]

## 2021-03-17 NOTE — PATIENT INSTRUCTIONS
Websites with free information:    [employer] patient website: www.voicesforpfd.org    Total Control Program: www.totalcontrolprogram.com    It was a pleasure meeting with you today.  Thank you for allowing me and my team the privilege of caring for you today.  YOU are the reason we are here, and I truly hope we provided you with the excellent service you deserve.  Please let us know if there is anything else we can do for you so that we can be sure you are leaving completely satisfied with your care experience.

## 2021-03-17 NOTE — LETTER
3/17/2021       RE: Maria Teresa Benedict  [address]  [place] [address]     Dear Colleague,    Thank you for referring your patient, Maria Teresa Benedict, to the [facility]OLOGY [facility] [place] at [facility]. Please see a copy of my visit note below.    *SEND LINK TO EMAIL INBOX*  EMAIL BELOW...    Maria Teresa is a 50 year old who is being evaluated via a billable video visit.      How would you like to obtain your AVS? MyChart  If the video visit is dropped, the invitation should be resent by: Send to e-mail at: [email or web address]  Will anyone else be joining your video visit? No      Video Start Time: 1:53 PM    Assessment & Plan     Microscopic hematuria  She had a urinalysis with microscopic hematuria the time of appendicitis, could have been related to the inflammation.  She also has some bilateral hydro.  Will start with repeating a US.      Her most recent urinalysis in does not show microscopic hematuria and she does not have any risk factors so we discussed options and she wishes to observe so will repeat a urinalysis in about 6 months    - US Renal Complete; Future   - Routine UA with micro reflex to culture; Future    Return in about 6 months (around 9/17/2021) for using a video visit.    Linette Petty MD MPH  (she/her/hers)   of Urology  [facility]  CC  Patient Care Team:  Joce Becerra APRN CNP as PCP - General (Nurse Practitioner)  Laura Philippe MD as Assigned OBGYN Provider  Joce Becerra APRN CNP as Assigned PCP  JOCE BECERRA    Subjective   Maria Teresa is a 50 year old who presents for the following health issues     HPI     She is here today for evaluation of microscopic hematuria    Denies gross hematuria, UTI.  She denies bladder/pelvic surgery or family history of  disease or malignancy     at [facility]    Her urinalysis from March was  reviewed and there is small blood on the dip but the microscopic urinalysis shows 0-2 RBC    She has a microscopic urinalysis with blood in October but this is when she also had appendicitis and there was mild bilateral hydronephrosis at that time.  She has otherwise never had any urinary issues    She is not a smoker, has not worked around chemicals and denies any known family history of  disease or malignancy    Past Medical History:   Diagnosis Date     LSIL (low grade squamous intraepithelial lesion) on Pap smear     colp- focal koilocytosis     NO ACTIVE PROBLEMS      Shingles 2015     Past Surgical History:   Procedure Laterality Date     C/SECTION, LOW TRANSVERSE  ,     , Low Transverse     HC TOOTH EXTRACTION W/FORCEP      wisdom teeth     LAPAROSCOPIC APPENDECTOMY N/A 10/3/2020    Procedure: APPENDECTOMY, LAPAROSCOPIC;  Surgeon: Toni Linton MD;  Location: [facility] OR     TUBAL LIGATION      [facility]     Social History     Socioeconomic History     Marital status:      Spouse name: Oni Benedict     Number of children: 2     Years of education: 19     Highest education level: Not on file   Occupational History     Occupation: assistant prof     Comment: geography     Employer: [employer]   Social Needs     Financial resource strain: Not on file     Food insecurity     Worry: Not on file     Inability: Not on file     Transportation needs     Medical: Not on file     Non-medical: Not on file   Tobacco Use     Smoking status: Never Smoker     Smokeless tobacco: Never Used   Substance and Sexual Activity     Alcohol use: Yes     Alcohol/week: 0.0 standard drinks     Frequency: 2-3 times a week     Drinks per session: 1 or 2     Binge frequency: Never     Comment: occ     Drug use: No     Sexual activity: Not Currently     Partners: Male     Birth control/protection: Pill, Female Surgical     Comment: tubal ligation   Lifestyle     Physical activity     Days per week: Not  on file     Minutes per session: Not on file     Stress: Not on file   Relationships     Social connections     Talks on phone: Not on file     Gets together: Not on file     Attends Judaism service: Not on file     Active member of club or organization: Not on file     Attends meetings of clubs or organizations: Not on file     Relationship status: Not on file     Intimate partner violence     Fear of current or ex partner: Not on file     Emotionally abused: Not on file     Physically abused: Not on file     Forced sexual activity: Not on file   Other Topics Concern     Parent/sibling w/ CABG, MI or angioplasty before 65F 55M? No   Social History Narrative          Current Outpatient Medications   Medication     Multiple Vitamins-Minerals (MULTIVITAMIN ADULT PO)     norgestimate-ethinyl estradiol (ORTHO-CYCLEN) 0.25-35 MG-MCG tablet     No current facility-administered medications for this visit.         Allergies   Allergen Reactions     Penicillins Hives           Objective         Vitals:  No vitals were obtained today due to virtual visit.    Physical Exam   GENERAL: Healthy, alert and no distress  EYES: Eyes grossly normal to inspection.  No discharge or erythema, or obvious scleral/conjunctival abnormalities.  RESP: No audible wheeze, cough, or visible cyanosis.  No visible retractions or increased work of breathing.    SKIN: Visible skin clear. No significant rash, abnormal pigmentation or lesions.  NEURO: Cranial nerves grossly intact.  Mentation and speech appropriate for age.  PSYCH: Mentation appears normal, affect normal/bright, judgement and insight intact, normal speech and appearance well-groomed      Video-Visit Details    Type of service:  Video Visit    Video End Time:2:07 PM    Originating Location (pt. Location): Home    Distant Location (provider location):  [facility] UROLOGY CLINIC [place]     Platform used for Video Visit: [employer]

## 2021-03-18 ENCOUNTER — TELEPHONE (OUTPATIENT)
Dept: UROLOGY | Facility: CLINIC | Age: 51
End: 2021-03-18

## 2021-03-18 NOTE — TELEPHONE ENCOUNTER
----- Message from Sharmila Egan sent at 3/18/2021  8:19 AM CDT -----  Return in about 6 months (around 9/17/2021) for using a video visit.   Check out comments: Renal US    [facility]

## 2021-03-26 ENCOUNTER — HOSPITAL ENCOUNTER (OUTPATIENT)
Dept: ULTRASOUND IMAGING | Facility: CLINIC | Age: 51
Discharge: HOME | End: 2021-03-26
Attending: UROLOGY | Admitting: UROLOGY
Payer: OTHER GOVERNMENT

## 2021-03-26 DIAGNOSIS — R31.29 MICROSCOPIC HEMATURIA: ICD-10-CM

## 2021-03-26 PROCEDURE — 76770 US EXAM ABDO BACK WALL COMP: CPT

## 2021-08-18 ENCOUNTER — ANCILLARY PROCEDURE (OUTPATIENT)
Dept: MAMMOGRAPHY | Facility: CLINIC | Age: 51
End: 2021-08-18
Attending: OBSTETRICS & GYNECOLOGY
Payer: OTHER GOVERNMENT

## 2021-08-18 DIAGNOSIS — Z12.31 SCREENING MAMMOGRAM, ENCOUNTER FOR: ICD-10-CM

## 2021-08-18 DIAGNOSIS — Z12.31 VISIT FOR SCREENING MAMMOGRAM: ICD-10-CM

## 2021-08-18 PROCEDURE — 77063 BREAST TOMOSYNTHESIS BI: CPT | Mod: TC | Performed by: RADIOLOGY

## 2021-08-18 PROCEDURE — 77067 SCR MAMMO BI INCL CAD: CPT | Mod: TC | Performed by: RADIOLOGY

## 2021-09-05 ENCOUNTER — HEALTH MAINTENANCE LETTER (OUTPATIENT)
Age: 51
End: 2021-09-05

## 2021-09-14 ENCOUNTER — VIRTUAL VISIT (OUTPATIENT)
Dept: UROLOGY | Facility: CLINIC | Age: 51
End: 2021-09-14
Payer: OTHER GOVERNMENT

## 2021-09-14 VITALS — BODY MASS INDEX: 23.19 KG/M2 | HEIGHT: 62 IN | WEIGHT: 126 LBS

## 2021-09-14 DIAGNOSIS — R31.29 MICROSCOPIC HEMATURIA: Primary | ICD-10-CM

## 2021-09-14 PROCEDURE — 99213 OFFICE O/P EST LOW 20 MIN: CPT | Mod: 95 | Performed by: UROLOGY

## 2021-09-14 ASSESSMENT — MIFFLIN-ST. JEOR: SCORE: 1154.78

## 2021-09-14 ASSESSMENT — PAIN SCALES - GENERAL: PAINLEVEL: NO PAIN (0)

## 2021-09-14 NOTE — PROGRESS NOTES
Maria Teresa is a 50 year old who is being evaluated via a billable video visit.      How would you like to obtain your AVS? MyChart  If the video visit is dropped, the invitation should be resent by: Send to e-mail at: [email or web address]  Will anyone else be joining your video visit? No      Video Start Time: 3:44 PM    Assessment & Plan     Microscopic hematuria  She had microscopic hematuria at the time of appendicitis.  Recommend repeating urinalysis right now and as long as negative RTC prn  - UA with Microscopic; Future    Return if symptoms worsen or fail to improve.    5 minutes spent in reviewing the EMR, direct patient care including ordering urinalysis    Linette Petty MD MPH  (she/her/hers)   of Urology  [facility]    Subjective   Maria Teresa is a 50 year old who presents for the following health issues     HPI     6 month follow up for microscopic hematuria        Objective    Vitals - Patient Reported  Pain Score: No Pain (0)    Physical Exam   GENERAL: Healthy, alert and no distress  EYES: Eyes grossly normal to inspection.  No discharge or erythema, or obvious scleral/conjunctival abnormalities.  RESP: No audible wheeze, cough, or visible cyanosis.  No visible retractions or increased work of breathing.    SKIN: Visible skin clear. No significant rash, abnormal pigmentation or lesions.  NEURO: Cranial nerves grossly intact.  Mentation and speech appropriate for age.  PSYCH: Mentation appears normal, affect normal/bright, judgement and insight intact, normal speech and appearance well-groomed.          Video-Visit Details    Type of service:  Video Visit    Video End Time:3:48 PM    Originating Location (pt. Location): Home    Distant Location (provider location):  [facility] UROLOGY CLINIC [place]     Platform used for Video Visit: [employer]

## 2021-09-14 NOTE — PATIENT INSTRUCTIONS
Please do the urinalysis    It was a pleasure meeting with you today.  Thank you for allowing me and my team the privilege of caring for you today.  YOU are the reason we are here, and I truly hope we provided you with the excellent service you deserve.  Please let us know if there is anything else we can do for you so that we can be sure you are leaving completely satisfied with your care experience.

## 2021-09-14 NOTE — LETTER
9/14/2021       RE: Maria Teresa Benedict  [address]  [place] [address]     Dear Colleague,    Thank you for referring your patient, Maria Teresa Benedict, to the [facility] UROLOGY CLIN[facility] [place] at [facility]. Please see a copy of my visit note below.    Maria Teresa is a 50 year old who is being evaluated via a billable video visit.      How would you like to obtain your AVS? MyChart  If the video visit is dropped, the invitation should be resent by: Send to e-mail at: [email or web address]  Will anyone else be joining your video visit? No      Video Start Time: 3:44 PM    Assessment & Plan     Microscopic hematuria  She had microscopic hematuria at the time of appendicitis.  Recommend repeating urinalysis right now and as long as negative RTC prn  - UA with Microscopic; Future    Return if symptoms worsen or fail to improve.    5 minutes spent in reviewing the EMR, direct patient care including ordering urinalysis    Linette Petty MD MPH  (she/her/hers)   of Urology  [facility]    Subjective   Maria Teresa is a 50 year old who presents for the following health issues     HPI     6 month follow up for microscopic hematuria        Objective    Vitals - Patient Reported  Pain Score: No Pain (0)    Physical Exam   GENERAL: Healthy, alert and no distress  EYES: Eyes grossly normal to inspection.  No discharge or erythema, or obvious scleral/conjunctival abnormalities.  RESP: No audible wheeze, cough, or visible cyanosis.  No visible retractions or increased work of breathing.    SKIN: Visible skin clear. No significant rash, abnormal pigmentation or lesions.  NEURO: Cranial nerves grossly intact.  Mentation and speech appropriate for age.  PSYCH: Mentation appears normal, affect normal/bright, judgement and insight intact, normal speech and appearance well-groomed.          Video-Visit Details    Type of service:  Video  Visit    Video End Time:3:48 PM    Originating Location (pt. Location): Home    Distant Location (provider location):  [facility] UROLOGY [facility]     Platform used for Video Visit: [employer]

## 2021-10-05 ENCOUNTER — ALLIED HEALTH/NURSE VISIT (OUTPATIENT)
Dept: FAMILY MEDICINE | Facility: CLINIC | Age: 51
End: 2021-10-05
Payer: OTHER GOVERNMENT

## 2021-10-05 DIAGNOSIS — Z23 NEED FOR PROPHYLACTIC VACCINATION AND INOCULATION AGAINST INFLUENZA: Primary | ICD-10-CM

## 2021-10-05 DIAGNOSIS — Z23 ENCOUNTER FOR VACCINATION: ICD-10-CM

## 2021-10-05 PROCEDURE — 90471 IMMUNIZATION ADMIN: CPT

## 2021-10-05 PROCEDURE — 90686 IIV4 VACC NO PRSV 0.5 ML IM: CPT

## 2021-10-05 PROCEDURE — 99207 PR NO CHARGE NURSE ONLY: CPT

## 2021-10-05 NOTE — ADDENDUM NOTE
Addended by: SASHA HOYT on: 10/5/2021 03:43 PM     Modules accepted: Orders, Level of Service, SmartSet

## 2021-10-12 ENCOUNTER — OFFICE VISIT (OUTPATIENT)
Dept: OBGYN | Facility: CLINIC | Age: 51
End: 2021-10-12
Payer: OTHER GOVERNMENT

## 2021-10-12 VITALS
HEART RATE: 75 BPM | WEIGHT: 126 LBS | HEIGHT: 62 IN | DIASTOLIC BLOOD PRESSURE: 73 MMHG | OXYGEN SATURATION: 98 % | BODY MASS INDEX: 23.19 KG/M2 | SYSTOLIC BLOOD PRESSURE: 111 MMHG

## 2021-10-12 DIAGNOSIS — Z23 NEED FOR TDAP VACCINATION: ICD-10-CM

## 2021-10-12 DIAGNOSIS — Z01.419 ENCOUNTER FOR GYNECOLOGICAL EXAMINATION WITHOUT ABNORMAL FINDING: Primary | ICD-10-CM

## 2021-10-12 DIAGNOSIS — Z13.1 SCREENING FOR DIABETES MELLITUS: ICD-10-CM

## 2021-10-12 DIAGNOSIS — Z13.0 SCREENING, ANEMIA, DEFICIENCY, IRON: ICD-10-CM

## 2021-10-12 DIAGNOSIS — Z13.220 SCREENING FOR LIPID DISORDERS: ICD-10-CM

## 2021-10-12 DIAGNOSIS — R31.29 MICROSCOPIC HEMATURIA: ICD-10-CM

## 2021-10-12 DIAGNOSIS — Z13.29 SCREENING FOR THYROID DISORDER: ICD-10-CM

## 2021-10-12 DIAGNOSIS — Z13.21 ENCOUNTER FOR VITAMIN DEFICIENCY SCREENING: ICD-10-CM

## 2021-10-12 LAB
ALBUMIN UR-MCNC: NEGATIVE MG/DL
APPEARANCE UR: CLEAR
BACTERIA #/AREA URNS HPF: NORMAL /HPF
BILIRUB UR QL STRIP: NEGATIVE
COLOR UR AUTO: YELLOW
ERYTHROCYTE [DISTWIDTH] IN BLOOD BY AUTOMATED COUNT: 13.2 % (ref 10–15)
GLUCOSE UR STRIP-MCNC: NEGATIVE MG/DL
HBA1C MFR BLD: 5 % (ref 0–5.6)
HCT VFR BLD AUTO: 37.9 % (ref 35–47)
HGB BLD-MCNC: 12.7 G/DL (ref 11.7–15.7)
HGB UR QL STRIP: ABNORMAL
KETONES UR STRIP-MCNC: 15 MG/DL
LEUKOCYTE ESTERASE UR QL STRIP: NEGATIVE
MCH RBC QN AUTO: 31.3 PG (ref 26.5–33)
MCHC RBC AUTO-ENTMCNC: 33.5 G/DL (ref 31.5–36.5)
MCV RBC AUTO: 93 FL (ref 78–100)
NITRATE UR QL: NEGATIVE
PH UR STRIP: 5 [PH] (ref 5–7)
PLATELET # BLD AUTO: 322 10E3/UL (ref 150–450)
RBC # BLD AUTO: 4.06 10E6/UL (ref 3.8–5.2)
RBC #/AREA URNS AUTO: NORMAL /HPF
SP GR UR STRIP: 1.02 (ref 1–1.03)
UROBILINOGEN UR STRIP-ACNC: 0.2 E.U./DL
WBC # BLD AUTO: 7.6 10E3/UL (ref 4–11)
WBC #/AREA URNS AUTO: NORMAL /HPF

## 2021-10-12 PROCEDURE — 99396 PREV VISIT EST AGE 40-64: CPT | Mod: 25 | Performed by: OBSTETRICS & GYNECOLOGY

## 2021-10-12 PROCEDURE — 82306 VITAMIN D 25 HYDROXY: CPT | Performed by: OBSTETRICS & GYNECOLOGY

## 2021-10-12 PROCEDURE — 81001 URINALYSIS AUTO W/SCOPE: CPT | Performed by: OBSTETRICS & GYNECOLOGY

## 2021-10-12 PROCEDURE — 36415 COLL VENOUS BLD VENIPUNCTURE: CPT | Performed by: OBSTETRICS & GYNECOLOGY

## 2021-10-12 PROCEDURE — 83036 HEMOGLOBIN GLYCOSYLATED A1C: CPT | Performed by: OBSTETRICS & GYNECOLOGY

## 2021-10-12 PROCEDURE — 90471 IMMUNIZATION ADMIN: CPT | Performed by: OBSTETRICS & GYNECOLOGY

## 2021-10-12 PROCEDURE — 80053 COMPREHEN METABOLIC PANEL: CPT | Performed by: OBSTETRICS & GYNECOLOGY

## 2021-10-12 PROCEDURE — 84443 ASSAY THYROID STIM HORMONE: CPT | Performed by: OBSTETRICS & GYNECOLOGY

## 2021-10-12 PROCEDURE — 90715 TDAP VACCINE 7 YRS/> IM: CPT | Performed by: OBSTETRICS & GYNECOLOGY

## 2021-10-12 PROCEDURE — 85027 COMPLETE CBC AUTOMATED: CPT | Performed by: OBSTETRICS & GYNECOLOGY

## 2021-10-12 PROCEDURE — 80061 LIPID PANEL: CPT | Performed by: OBSTETRICS & GYNECOLOGY

## 2021-10-12 RX ORDER — NORGESTIMATE AND ETHINYL ESTRADIOL 0.25-0.035
1 KIT ORAL DAILY
Qty: 112 TABLET | Refills: 4 | Status: SHIPPED | OUTPATIENT
Start: 2021-10-12 | End: 2022-12-01

## 2021-10-12 ASSESSMENT — MIFFLIN-ST. JEOR: SCORE: 1149.78

## 2021-10-12 NOTE — PROGRESS NOTES
Maria Teresa is a 51 year old  female who presents for annual exam.     Menses are regular q 28-30 days and normal lasting 4 days.  Menses flow: normal.  Patient's last menstrual period was 2021.. Using oral contraceptives for contraception.  She is not currently considering pregnancy.  Besides routine health maintenance, she has no other health concerns today . Boys are now 15 y/o and 11 y/o.  Doing okay but youngest developed panic attacks and seeing a therapist. Still working at [employer]. Fasting today for labs.   GYNECOLOGIC HISTORY:  Menarche  Maria Teresa is not sexually active   History sexually transmitted infections:No STD history  STI testing offered?  Declined  LAURA exposure: Unknown  History of abnormal Pap smear: NO - age 30-65 PAP every 5 years with negative HPV co-testing recommended  Family history of breast CA: No  Family history of uterine/ovarian CA: No    Family history of colon CA: No    HEALTH MAINTENANCE:  Cholesterol: (  Cholesterol   Date Value Ref Range Status   2018 189 <200 mg/dL Final   2015 167 <200 mg/dL Final     Comment:     LDL Cholesterol is the primary guide to therapy.   The NCEP recommends further evaluation of: patients with cholesterol greater   than 200 mg/dL if additional risk factors are present, cholesterol greater   than   240 mg/dL, triglycerides greater than 150 mg/dL, or HDL less than 40 mg/dL.      History of abnormal lipids: No  Mammo: 23 . History of abnormal Mammo: No.  Regular Self Breast Exams: Yes  Calcium/Vitamin D intake: source:  dairy, dietary supplement(s) Adequate? Yes  TSH: (  TSH   Date Value Ref Range Status   2018 1.80 0.40 - 4.00 mU/L Final    )  Pap; (  Lab Results   Component Value Date    PAP NIL 2020    PAP NIL 07/10/2015    PAP NIL 2012    )    HISTORY:  OB History    Para Term  AB Living   2 2 2 0 0 2   SAB TAB Ectopic Multiple Live Births   0 0 0 0 2      # Outcome Date GA Lbr Tu/2nd Weight Sex  Delivery Anes PTL Lv   2 Term 09 38w3d  3.572 kg (7 lb 14 oz) M CS-Unspec   WADE      Birth Comments: repeat in labor, PPTL      Name: Darin Harry)   1 Term 06 39w3d  3.629 kg (8 lb) M CS   WADE      Birth Comments: arrest of dilation, ROT position, non-reassuring FHT      Name: Deniz Chacon     Past Medical History:   Diagnosis Date     LSIL (low grade squamous intraepithelial lesion) on Pap smear     colp- focal koilocytosis     NO ACTIVE PROBLEMS      Shingles 2015     Past Surgical History:   Procedure Laterality Date     C/SECTION, LOW TRANSVERSE  ,     , Low Transverse     HC TOOTH EXTRACTION W/FORCEP      wisdom teeth     LAPAROSCOPIC APPENDECTOMY N/A 10/3/2020    Procedure: APPENDECTOMY, LAPAROSCOPIC;  Surgeon: Toni Linton MD;  Location: [facility] OR     TUBAL LIGATION      PPTL     Family History   Problem Relation Age of Onset     Circulatory Mother         blood clots after SAB     Gastrointestinal Disease Mother         polyps removed     Thyroid Disease Mother         hypothyroid     Hypertension Mother      Macular Degeneration Mother      Hypertension Father      Hypertension Paternal Grandmother      Diabetes Paternal Grandmother      Arthritis Maternal Grandmother      Osteoporosis Maternal Grandmother      Hypertension Maternal Grandfather      Eye Disorder Maternal Grandfather         glacoma     Heart Disease Maternal Grandfather         MI     Cancer Paternal Grandfather         melanoma     Blood Disease Paternal Grandfather         hep     Respiratory Brother         asthma     Social History     Socioeconomic History     Marital status:      Spouse name: Oni Benedict     Number of children: 2     Years of education: 19     Highest education level: Not on file   Occupational History     Occupation: assistant prof     Comment: geography     Employer: [employer]   Tobacco Use     Smoking status: Never Smoker     Smokeless tobacco: Never Used  "  Substance and Sexual Activity     Alcohol use: Yes     Alcohol/week: 0.0 standard drinks     Comment: occ     Drug use: No     Sexual activity: Not Currently     Partners: Male     Birth control/protection: Pill, Female Surgical     Comment: tubal ligation   Other Topics Concern     Parent/sibling w/ CABG, MI or angioplasty before 65F 55M? No   Social History Narrative          Social Determinants of Health     Financial Resource Strain:      Difficulty of Paying Living Expenses:    Food Insecurity:      Worried About Running Out of Food in the Last Year:      Ran Out of Food in the Last Year:    Transportation Needs:      Lack of Transportation (Medical):      Lack of Transportation (Non-Medical):    Physical Activity:      Days of Exercise per Week:      Minutes of Exercise per Session:    Stress:      Feeling of Stress :    Social Connections:      Frequency of Communication with Friends and Family:      Frequency of Social Gatherings with Friends and Family:      Attends Lutheran Services:      Active Member of Clubs or Organizations:      Attends Club or Organization Meetings:      Marital Status:    Intimate Partner Violence:      Fear of Current or Ex-Partner:      Emotionally Abused:      Physically Abused:      Sexually Abused:        Current Outpatient Medications:      Multiple Vitamins-Minerals (MULTIVITAMIN ADULT PO), Take 1 tablet by mouth daily , Disp: , Rfl:      norgestimate-ethinyl estradiol (ORTHO-CYCLEN) 0.25-35 MG-MCG tablet, Take 1 tablet by mouth daily Active tablets daily for prevention of menses, Disp: 112 tablet, Rfl: 4     Allergies   Allergen Reactions     Pcn [Penicillins] Hives       Past medical, surgical, social and family history were reviewed and updated in EPIC.    EXAM:  /73   Pulse 75   Ht 1.575 m (5' 2\")   Wt 57.2 kg (126 lb)   LMP 09/20/2021   SpO2 98%   BMI 23.05 kg/m     BMI: Body mass index is 23.05 kg/m .  Constitutional: healthy, alert and no distress  Head: " Normocephalic. No masses, lesions, tenderness or abnormalities  Neck: Neck supple. Trachea midline. No adenopathy. Thyroid symmetric, normal size.   Cardiovascular: RRR.   Respiratory: Negative.   Breast: Breasts reveal mild symmetric fibrocystic densities, but there are no dominant, discrete, fixed or suspicious masses found.  Gastrointestinal: Abdomen soft, non-tender, non-distended. No masses, organomegaly.  :  Vulva:  No external lesions, normal female hair distribution, no inguinal adenopathy.    Urethra:  Midline, non-tender, well supported, no discharge  Vagina:  Moist, pink, no abnormal discharge, no lesions  Uterus:  Normal size , non-tender, freely mobile  Ovaries:  No masses appreciated, non-tender, mobile  Rectal Exam: deferred  Musculoskeletal: extremities normal  Skin: no suspicious lesions or rashes  Psychiatric: Affect appropriate, cooperative,mentation appears normal.     COUNSELING:   Reviewed preventive health counseling, as reflected in patient instructions   reports that she has never smoked. She has never used smokeless tobacco.    Body mass index is 23.05 kg/m .    FRAX Risk Assessment    ASSESSMENT:  51 year old female with satisfactory annual exam  (Z01.419) Encounter for gynecological examination without abnormal finding  (primary encounter diagnosis)  Comment: OCP  Plan: norgestimate-ethinyl estradiol (ORTHO-CYCLEN)         0.25-35 MG-MCG tablet        Discussed continuous use and check if having menopause sx on placebo week.     (Z23) Need for Tdap vaccination  Plan: VACCINE ADMINISTRATION MNVFC, INITIAL, TDAP         VACCINE (Adacel, Boostrix)  [[id number]]    (Z13.0) Screening, anemia, deficiency, iron  Plan: CBC with platelets        Check lab today    (Z13.220) Screening for lipid disorders  Comment: fasting  Plan: Lipid panel reflex to direct LDL Fasting        Check lab today    (Z13.1) Screening for diabetes mellitus  Comment: fasting  Plan: Comprehensive metabolic panel, Hemoglobin  A1c        Check lab today    (Z13.29) Screening for thyroid disorder  Plan: TSH with free T4 reflex        Check lab today    (Z13.21) Encounter for vitamin deficiency screening  Comment: only taking MVI  Plan: Vitamin D Deficiency        Check lab today      Laura Philippe MD

## 2021-10-13 LAB
ALBUMIN SERPL-MCNC: 3.4 G/DL (ref 3.4–5)
ALP SERPL-CCNC: 63 U/L (ref 40–150)
ALT SERPL W P-5'-P-CCNC: 16 U/L (ref 0–50)
ANION GAP SERPL CALCULATED.3IONS-SCNC: 8 MMOL/L (ref 3–14)
AST SERPL W P-5'-P-CCNC: 11 U/L (ref 0–45)
BILIRUB SERPL-MCNC: 0.4 MG/DL (ref 0.2–1.3)
BUN SERPL-MCNC: 12 MG/DL (ref 7–30)
CALCIUM SERPL-MCNC: 8.5 MG/DL (ref 8.5–10.1)
CHLORIDE BLD-SCNC: 106 MMOL/L (ref 94–109)
CHOLEST SERPL-MCNC: 192 MG/DL
CO2 SERPL-SCNC: 22 MMOL/L (ref 20–32)
CREAT SERPL-MCNC: 0.8 MG/DL (ref 0.52–1.04)
DEPRECATED CALCIDIOL+CALCIFEROL SERPL-MC: 42 UG/L (ref 20–75)
FASTING STATUS PATIENT QL REPORTED: YES
GFR SERPL CREATININE-BSD FRML MDRD: 87 ML/MIN/1.73M2
GLUCOSE BLD-MCNC: 83 MG/DL (ref 70–99)
HDLC SERPL-MCNC: 83 MG/DL
LDLC SERPL CALC-MCNC: 93 MG/DL
NONHDLC SERPL-MCNC: 109 MG/DL
POTASSIUM BLD-SCNC: 3.9 MMOL/L (ref 3.4–5.3)
PROT SERPL-MCNC: 6.9 G/DL (ref 6.8–8.8)
SODIUM SERPL-SCNC: 136 MMOL/L (ref 133–144)
TRIGL SERPL-MCNC: 78 MG/DL
TSH SERPL DL<=0.005 MIU/L-ACNC: 2.27 MU/L (ref 0.4–4)

## 2022-08-24 ENCOUNTER — ANCILLARY PROCEDURE (OUTPATIENT)
Dept: MAMMOGRAPHY | Facility: CLINIC | Age: 52
End: 2022-08-24
Attending: NURSE PRACTITIONER
Payer: OTHER GOVERNMENT

## 2022-08-24 DIAGNOSIS — Z12.31 VISIT FOR SCREENING MAMMOGRAM: ICD-10-CM

## 2022-08-24 PROCEDURE — 77067 SCR MAMMO BI INCL CAD: CPT | Mod: TC | Performed by: RADIOLOGY

## 2022-08-24 PROCEDURE — 77063 BREAST TOMOSYNTHESIS BI: CPT | Mod: TC | Performed by: RADIOLOGY

## 2022-10-23 ENCOUNTER — HEALTH MAINTENANCE LETTER (OUTPATIENT)
Age: 52
End: 2022-10-23

## 2022-10-25 ENCOUNTER — OFFICE VISIT (OUTPATIENT)
Dept: URGENT CARE | Facility: URGENT CARE | Age: 52
End: 2022-10-25
Payer: OTHER GOVERNMENT

## 2022-10-25 ENCOUNTER — ANCILLARY PROCEDURE (OUTPATIENT)
Dept: GENERAL RADIOLOGY | Facility: CLINIC | Age: 52
End: 2022-10-25
Attending: PHYSICIAN ASSISTANT
Payer: OTHER GOVERNMENT

## 2022-10-25 VITALS
DIASTOLIC BLOOD PRESSURE: 78 MMHG | OXYGEN SATURATION: 98 % | TEMPERATURE: 98 F | SYSTOLIC BLOOD PRESSURE: 143 MMHG | HEART RATE: 78 BPM | RESPIRATION RATE: 20 BRPM

## 2022-10-25 DIAGNOSIS — S99.911A ANKLE INJURY, RIGHT, INITIAL ENCOUNTER: ICD-10-CM

## 2022-10-25 DIAGNOSIS — S99.911A ANKLE INJURY, RIGHT, INITIAL ENCOUNTER: Primary | ICD-10-CM

## 2022-10-25 PROCEDURE — 99213 OFFICE O/P EST LOW 20 MIN: CPT | Performed by: PHYSICIAN ASSISTANT

## 2022-10-25 PROCEDURE — 73610 X-RAY EXAM OF ANKLE: CPT | Mod: TC | Performed by: RADIOLOGY

## 2022-10-25 RX ORDER — IBUPROFEN 600 MG/1
600 TABLET, FILM COATED ORAL EVERY 6 HOURS PRN
Qty: 30 TABLET | Refills: 0 | Status: SHIPPED | OUTPATIENT
Start: 2022-10-25

## 2022-10-25 NOTE — PROGRESS NOTES
Assessment & Plan     Ankle injury, right, initial encounter    Treating Ankle Sprains  Treatment will depend on how bad your sprain is. For a severe sprain, healing may take 3 months or more.  Right after your injury: Use R.I.C.E.    BIG: Rest: At first, keep weight off the ankle as much as you can. You may be given crutches to help you walk without putting weight on the ankle.    BIG: Ice: Put an ice pack on the ankle for 20 minutes. Remove the pack and wait at least 30 minutes. Repeat for up to 3 days. This helps reduce swelling.    BIG: Compression: To reduce swelling and keep the joint stable, you may need to wrap the ankle with an elastic bandage. For more severe sprains, you may need an ankle brace, a boot, or a cast.    BIG: Elevation: To reduce swelling, keep your ankle raised above your heart when you sit or lie down.      - XR Ankle Right G/E 3 Views; Future  - Ankle/Foot Bracing Supplies Order for DME - ONLY FOR DME  - ibuprofen (ADVIL/MOTRIN) 600 MG tablet; Take 1 tablet (600 mg) by mouth every 6 hours as needed for moderate pain       At today's visit with Maria Teresagrecia Benedict , we discussed results, diagnosis, medications and formulated a plan.  We also discussed red flags for immediate return to clinic/ER, as well as indications for follow up with PCP if not improved in 3 days. Patient understood and agreed to plan. Maria Teresa Benedict was discharged with stable vitals and has no further questions.       No follow-ups on file.    Colin Matias, [facility], PA-C  [facility] URGENT CARE EVELIO Morel is a 52 year old, presenting for the following health issues:  Musculoskeletal Problem (R ankle pain pt fell )      HPI   Review of Systems   Constitutional, HEENT, cardiovascular, pulmonary, gi and gu systems are negative, except as otherwise noted.      Objective    BP (!) 143/78   Pulse 78   Temp 98  F (36.7  C)   Resp 20   SpO2 98%   There is no height or weight on file to  calculate BMI.  Physical Exam   GENERAL: healthy, alert and no distress  EYES: Eyes grossly normal to inspection, PERRL and conjunctivae and sclerae normal  HENT: ear canals and TM's normal, nose and mouth without ulcers or lesions  NECK: no adenopathy, no asymmetry, masses, or scars and thyroid normal to palpation  RESP: lungs clear to auscultation - no rales, rhonchi or wheezes  CV: regular rate and rhythm, normal S1 S2, no S3 or S4, no murmur, click or rub, no peripheral edema and peripheral pulses strong  ABDOMEN: soft, nontender, no hepatosplenomegaly, no masses and bowel sounds normal  MS: Positive for right lateral ankle tenderness  SKIN: Positive for mild swelling  NEURO: Normal strength and tone, mentation intact and speech normal      Ankle xray Negative for acute findings, read by Colin RINCON at time of visit.

## 2022-12-01 ENCOUNTER — OFFICE VISIT (OUTPATIENT)
Dept: OBGYN | Facility: CLINIC | Age: 52
End: 2022-12-01
Payer: OTHER GOVERNMENT

## 2022-12-01 VITALS
DIASTOLIC BLOOD PRESSURE: 60 MMHG | WEIGHT: 138 LBS | SYSTOLIC BLOOD PRESSURE: 106 MMHG | BODY MASS INDEX: 25.4 KG/M2 | HEIGHT: 62 IN

## 2022-12-01 DIAGNOSIS — Z12.11 SPECIAL SCREENING FOR MALIGNANT NEOPLASMS, COLON: ICD-10-CM

## 2022-12-01 DIAGNOSIS — Z01.419 ENCOUNTER FOR GYNECOLOGICAL EXAMINATION WITHOUT ABNORMAL FINDING: Primary | ICD-10-CM

## 2022-12-01 PROCEDURE — 99396 PREV VISIT EST AGE 40-64: CPT | Performed by: OBSTETRICS & GYNECOLOGY

## 2022-12-01 RX ORDER — NORGESTIMATE AND ETHINYL ESTRADIOL 0.25-0.035
1 KIT ORAL DAILY
Qty: 112 TABLET | Refills: 4 | Status: SHIPPED | OUTPATIENT
Start: 2022-12-01 | End: 2023-11-30

## 2022-12-01 NOTE — PROGRESS NOTES
Maria Teresa is a 52 year old  female who presents for annual exam.     Menses are regular q 28-30 days and normal lasting 4 days.  Menses flow: normal.  Patient's last menstrual period was 2022.. Using oral contraceptives for contraception.  She is not currently considering pregnancy.  Besides routine health maintenance, she has no other health concerns today . Doing a sabbatical next semester--can just rest and get the house in order.  Boys are 16 and 13 now.  Still doing hours with learning permit for oldest, just has not been interested in driving. Not looking at colleges yet.  Has been taking full week off each month of OCP and not sure if increased sx on placebo tablets?  GYNECOLOGIC HISTORY:  Menarche  Maria Teresa is sexually active with 1male partner(s) and is currently in monogamous relationship.    History sexually transmitted infections:No STD history  STI testing offered?  Declined  LAURA exposure: Unknown  History of abnormal Pap smear: NO - age 30-65 PAP every 5 years with negative HPV co-testing recommended  Family history of breast CA: Yes (Please explain): mom  Family history of uterine/ovarian CA: No    Family history of colon CA: No    HEALTH MAINTENANCE:  Cholesterol: (  Cholesterol   Date Value Ref Range Status   10/12/2021 192 <200 mg/dL Final   2018 189 <200 mg/dL Final   2015 167 <200 mg/dL Final     Comment:     LDL Cholesterol is the primary guide to therapy.   The NCEP recommends further evaluation of: patients with cholesterol greater   than 200 mg/dL if additional risk factors are present, cholesterol greater   than   240 mg/dL, triglycerides greater than 150 mg/dL, or HDL less than 40 mg/dL.      History of abnormal lipids: No  Mammo: 2022 . History of abnormal Mammo: No.  Regular Self Breast Exams: Yes  Calcium/Vitamin D intake: source:  dairy, dietary supplement(s) Adequate? Yes  TSH: (  TSH   Date Value Ref Range Status   10/12/2021 2.27 0.40 - 4.00 mU/L Final    2018 1.80 0.40 - 4.00 mU/L Final    )  Pap; (  Lab Results   Component Value Date    PAP NIL 2020    PAP NIL 07/10/2015    PAP NIL 2012    )    HISTORY:  OB History    Para Term  AB Living   2 2 2 0 0 2   SAB IAB Ectopic Multiple Live Births   0 0 0 0 2      # Outcome Date GA Lbr Tu/2nd Weight Sex Delivery Anes PTL Lv   2 Term 09 38w3d  3.572 kg (7 lb 14 oz) M CS-Unspec   WADE      Birth Comments: repeat in labor, PPTL      Name: Darin (Srinivas)   1 Term 06 39w3d  3.629 kg (8 lb) M CS   WADE      Birth Comments: arrest of dilation, ROT position, non-reassuring FHT      Name: Deniz De La Pazn     Past Medical History:   Diagnosis Date     LSIL (low grade squamous intraepithelial lesion) on Pap smear     colp- focal koilocytosis     NO ACTIVE PROBLEMS      Shingles 2015     Past Surgical History:   Procedure Laterality Date     C/SECTION, LOW TRANSVERSE  ,     , Low Transverse     HC TOOTH EXTRACTION W/FORCEP      wisdom teeth     LAPAROSCOPIC APPENDECTOMY N/A 10/3/2020    Procedure: APPENDECTOMY, LAPAROSCOPIC;  Surgeon: Toni Linton MD;  Location: [facility]     TUBAL LIGATION      PPTL     Family History   Problem Relation Age of Onset     Circulatory Mother         blood clots after SAB     Gastrointestinal Disease Mother         polyps removed     Thyroid Disease Mother         hypothyroid     Hypertension Mother      Macular Degeneration Mother      Breast Cancer Mother      Hypertension Father      Respiratory Brother         asthma     Arthritis Maternal Grandmother      Osteoporosis Maternal Grandmother      Hypertension Maternal Grandfather      Eye Disorder Maternal Grandfather         glacoma     Heart Disease Maternal Grandfather         MI     Hypertension Paternal Grandmother      Diabetes Paternal Grandmother      Cancer Paternal Grandfather         melanoma     Blood Disease Paternal Grandfather         hep     Social History  "    Socioeconomic History     Marital status:      Spouse name: Oni Benedict     Number of children: 2     Years of education: 19   Occupational History     Occupation: assistant prof     Comment: geography     Employer: [employer]   Tobacco Use     Smoking status: Never     Smokeless tobacco: Never   Substance and Sexual Activity     Alcohol use: Yes     Alcohol/week: 0.0 standard drinks     Comment: occ     Drug use: No     Sexual activity: Not Currently     Partners: Male     Birth control/protection: Pill, Female Surgical     Comment: tubal ligation   Other Topics Concern     Parent/sibling w/ CABG, MI or angioplasty before 65F 55M? No   Social History Narrative            Current Outpatient Medications:      ibuprofen (ADVIL/MOTRIN) 600 MG tablet, Take 1 tablet (600 mg) by mouth every 6 hours as needed for moderate pain, Disp: 30 tablet, Rfl: 0     Multiple Vitamins-Minerals (MULTIVITAMIN ADULT PO), Take 1 tablet by mouth daily , Disp: , Rfl:      norgestimate-ethinyl estradiol (ORTHO-CYCLEN) 0.25-35 MG-MCG tablet, Take 1 tablet by mouth daily Active tablets daily for prevention of menses, Disp: 112 tablet, Rfl: 4     Allergies   Allergen Reactions     Pcn [Penicillins] Hives       Past medical, surgical, social and family history were reviewed and updated in [facility].    EXAM:  /60   Ht 1.562 m (5' 1.5\")   Wt 62.6 kg (138 lb)   LMP 11/14/2022   BMI 25.65 kg/m     BMI: Body mass index is 25.65 kg/m .  Constitutional: healthy, alert and no distress  Head: Normocephalic. No masses, lesions, tenderness or abnormalities  Neck: Neck supple. Trachea midline. No adenopathy. Thyroid symmetric, normal size.   Cardiovascular: RRR.   Respiratory: Negative.   Breast: Breasts reveal mild symmetric fibrocystic densities, but there are no dominant, discrete, fixed or suspicious masses found.  Gastrointestinal: Abdomen soft, non-tender, non-distended. No masses, organomegaly.  :  Vulva:  No external " lesions, normal female hair distribution, no inguinal adenopathy.    Urethra:  Midline, non-tender, well supported, no discharge  Vagina:  Moist, pink, no abnormal discharge, no lesions  Uterus:  Normal size , non-tender, freely mobile  Ovaries:  No masses appreciated, non-tender, mobile  Rectal Exam: deferred  Musculoskeletal: extremities normal  Skin: no suspicious lesions or rashes  Psychiatric: Affect appropriate, cooperative,mentation appears normal.     COUNSELING:   Reviewed preventive health counseling, as reflected in patient instructions       Osteoporosis prevention/bone health   reports that she has never smoked. She has never used smokeless tobacco.    Body mass index is 25.65 kg/m .    FRAX Risk Assessment    ASSESSMENT:  52 year old female with satisfactory annual exam  (Z01.419) Encounter for gynecological examination without abnormal finding  (primary encounter diagnosis)  Comment: tubal ligation  Plan: norgestimate-ethinyl estradiol (ORTHO-CYCLEN)         0.25-35 MG-MCG tablet        Refill of OCP was done and continuous use discussed. Also will monitor for perimenopause sx on placebo week. Given book title to read and discussed Ca and weight bearing exercise.     Labs 2021 were normal, plan fasting labs next year and add hep C then. She will make appt for shingles vaccine during her sabbatical     (Z12.11) Special screening for malignant neoplasms, colon  Comment: age  Plan: COLOGUARD([employer] SCI[employer]ES)        Prefers cologuard so this was ordered.       Laura Philippe MD

## 2022-12-01 NOTE — PATIENT INSTRUCTIONS
There are 2 ways to take the pill/patch/ring continuously:  1) take active tablets for 3 weeks, or patches for 3 weeks or ring for 3 weeks, then instead of one week off do another 1-2 months of active pill/patch or ring.   (ie you will be on active hormones for 6 or 9 weeks and then do one week off so skipping periods)     **2) you can take active tablets/patch or ring until you have red bleeding, then take 4-7 days off of hormone and restart.   Bleeding will happen at random times.      The menopause manifesto book

## 2022-12-28 LAB — NONINV COLON CA DNA+OCC BLD SCRN STL QL: NEGATIVE

## 2023-08-28 ENCOUNTER — ANCILLARY PROCEDURE (OUTPATIENT)
Dept: MAMMOGRAPHY | Facility: CLINIC | Age: 53
End: 2023-08-28
Payer: OTHER GOVERNMENT

## 2023-08-28 DIAGNOSIS — Z12.31 VISIT FOR SCREENING MAMMOGRAM: ICD-10-CM

## 2023-08-28 PROCEDURE — 77063 BREAST TOMOSYNTHESIS BI: CPT | Mod: TC | Performed by: RADIOLOGY

## 2023-08-28 PROCEDURE — 77067 SCR MAMMO BI INCL CAD: CPT | Mod: TC | Performed by: RADIOLOGY

## 2023-11-30 ENCOUNTER — OFFICE VISIT (OUTPATIENT)
Dept: OBGYN | Facility: CLINIC | Age: 53
End: 2023-11-30
Payer: OTHER GOVERNMENT

## 2023-11-30 VITALS
HEIGHT: 62 IN | OXYGEN SATURATION: 100 % | SYSTOLIC BLOOD PRESSURE: 105 MMHG | BODY MASS INDEX: 26.48 KG/M2 | HEART RATE: 71 BPM | WEIGHT: 143.9 LBS | DIASTOLIC BLOOD PRESSURE: 65 MMHG

## 2023-11-30 DIAGNOSIS — Z13.0 SCREENING, ANEMIA, DEFICIENCY, IRON: ICD-10-CM

## 2023-11-30 DIAGNOSIS — Z11.59 NEED FOR HEPATITIS C SCREENING TEST: ICD-10-CM

## 2023-11-30 DIAGNOSIS — Z13.29 SCREENING FOR THYROID DISORDER: ICD-10-CM

## 2023-11-30 DIAGNOSIS — Z13.1 SCREENING FOR DIABETES MELLITUS: ICD-10-CM

## 2023-11-30 DIAGNOSIS — Z01.419 ENCOUNTER FOR GYNECOLOGICAL EXAMINATION WITHOUT ABNORMAL FINDING: Primary | ICD-10-CM

## 2023-11-30 DIAGNOSIS — Z13.220 SCREENING FOR LIPID DISORDERS: ICD-10-CM

## 2023-11-30 LAB
ALBUMIN SERPL BCG-MCNC: 3.9 G/DL (ref 3.5–5.2)
ALP SERPL-CCNC: 72 U/L (ref 40–150)
ALT SERPL W P-5'-P-CCNC: 11 U/L (ref 0–50)
ANION GAP SERPL CALCULATED.3IONS-SCNC: 10 MMOL/L (ref 7–15)
AST SERPL W P-5'-P-CCNC: 15 U/L (ref 0–45)
BILIRUB SERPL-MCNC: 0.3 MG/DL
BUN SERPL-MCNC: 10.1 MG/DL (ref 6–20)
CALCIUM SERPL-MCNC: 8.9 MG/DL (ref 8.6–10)
CHLORIDE SERPL-SCNC: 104 MMOL/L (ref 98–107)
CHOLEST SERPL-MCNC: 210 MG/DL
CREAT SERPL-MCNC: 0.77 MG/DL (ref 0.51–0.95)
DEPRECATED HCO3 PLAS-SCNC: 25 MMOL/L (ref 22–29)
EGFRCR SERPLBLD CKD-EPI 2021: >90 ML/MIN/1.73M2
ERYTHROCYTE [DISTWIDTH] IN BLOOD BY AUTOMATED COUNT: 13.1 % (ref 10–15)
GLUCOSE SERPL-MCNC: 90 MG/DL (ref 70–99)
HBA1C MFR BLD: 5.2 % (ref 0–5.6)
HCT VFR BLD AUTO: 40 % (ref 35–47)
HCV AB SERPL QL IA: NONREACTIVE
HDLC SERPL-MCNC: 78 MG/DL
HGB BLD-MCNC: 13.1 G/DL (ref 11.7–15.7)
LDLC SERPL CALC-MCNC: 109 MG/DL
MCH RBC QN AUTO: 31.1 PG (ref 26.5–33)
MCHC RBC AUTO-ENTMCNC: 32.8 G/DL (ref 31.5–36.5)
MCV RBC AUTO: 95 FL (ref 78–100)
NONHDLC SERPL-MCNC: 132 MG/DL
PLATELET # BLD AUTO: 309 10E3/UL (ref 150–450)
POTASSIUM SERPL-SCNC: 4.4 MMOL/L (ref 3.4–5.3)
PROT SERPL-MCNC: 6.7 G/DL (ref 6.4–8.3)
RBC # BLD AUTO: 4.21 10E6/UL (ref 3.8–5.2)
SODIUM SERPL-SCNC: 139 MMOL/L (ref 135–145)
TRIGL SERPL-MCNC: 115 MG/DL
TSH SERPL DL<=0.005 MIU/L-ACNC: 3.03 UIU/ML (ref 0.3–4.2)
WBC # BLD AUTO: 7.7 10E3/UL (ref 4–11)

## 2023-11-30 PROCEDURE — 99396 PREV VISIT EST AGE 40-64: CPT | Performed by: OBSTETRICS & GYNECOLOGY

## 2023-11-30 PROCEDURE — 85027 COMPLETE CBC AUTOMATED: CPT | Performed by: OBSTETRICS & GYNECOLOGY

## 2023-11-30 PROCEDURE — 83036 HEMOGLOBIN GLYCOSYLATED A1C: CPT | Performed by: OBSTETRICS & GYNECOLOGY

## 2023-11-30 PROCEDURE — 80061 LIPID PANEL: CPT | Performed by: OBSTETRICS & GYNECOLOGY

## 2023-11-30 PROCEDURE — 84443 ASSAY THYROID STIM HORMONE: CPT | Performed by: OBSTETRICS & GYNECOLOGY

## 2023-11-30 PROCEDURE — 80053 COMPREHEN METABOLIC PANEL: CPT | Performed by: OBSTETRICS & GYNECOLOGY

## 2023-11-30 PROCEDURE — 36415 COLL VENOUS BLD VENIPUNCTURE: CPT | Performed by: OBSTETRICS & GYNECOLOGY

## 2023-11-30 PROCEDURE — 86803 HEPATITIS C AB TEST: CPT | Performed by: OBSTETRICS & GYNECOLOGY

## 2023-11-30 RX ORDER — NORGESTIMATE AND ETHINYL ESTRADIOL 0.25-0.035
1 KIT ORAL DAILY
Qty: 112 TABLET | Refills: 4 | Status: SHIPPED | OUTPATIENT
Start: 2023-11-30

## 2023-11-30 NOTE — PROGRESS NOTES
Maria Teresa is a 53 year old  female who presents for annual exam.     Menses are regular q 5 weeks and normal lasting  3  days.  Menses flow: normal.  Patient's last menstrual period was 10/23/2023.. Using oral contraceptives for contraception.  She is not currently considering pregnancy.  Besides routine health maintenance, she has no other health concerns today . Boys are now 17 and senior and 15 y/o. Applying to colleges. 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  Still needs to take a break from OCP with spotting every 6 to 7 weeks.  Takes a full week off and still not really noticing symptoms except for maybe sleep issues.  GYNECOLOGIC HISTORY:  Menarche:  Maria Teresa is sexually active with 1male partner(s) and is currently in monogamous relationship.    History sexually transmitted infections:No STD history  STI testing offered?  Declined  LAURA exposure: Unknown  History of abnormal Pap smear: NO - age 30-65 PAP every 5 years with negative HPV co-testing recommended  Family history of breast CA: Yes (Please explain): mom  Family history of uterine/ovarian CA: No    Family history of colon CA: No    HEALTH MAINTENANCE:  Cholesterol: (  Cholesterol   Date Value Ref Range Status   10/12/2021 192 <200 mg/dL Final   2018 189 <200 mg/dL Final   2015 167 <200 mg/dL Final     Comment:     LDL Cholesterol is the primary guide to therapy.   The NCEP recommends further evaluation of: patients with cholesterol greater   than 200 mg/dL if additional risk factors are present, cholesterol greater   than   240 mg/dL, triglycerides greater than 150 mg/dL, or HDL less than 40 mg/dL.      History of abnormal lipids: No  Mammo: 23 . History of abnormal Mammo: No.  Regular Self Breast Exams: Yes  Calcium/Vitamin D intake: source:  dairy, dietary supplement(s) Adequate? Yes  TSH: (  TSH   Date  Value Ref Range Status   10/12/2021 2.27 0.40 - 4.00 mU/L Final   2018 1.80 0.40 - 4.00 mU/L Final    )  Pap; (  Lab Results   Component Value Date    PAP NIL 2020    PAP NIL 07/10/2015    PAP NIL 2012    )    HISTORY:  OB History    Para Term  AB Living   2 2 2 0 0 2   SAB IAB Ectopic Multiple Live Births   0 0 0 0 2      # Outcome Date GA Lbr Tu/2nd Weight Sex Delivery Anes PTL Lv   2 Term 09 38w3d  3.572 kg (7 lb 14 oz) M CS-Unspec   WADE      Birth Comments: repeat in labor, PPTL      Name: Darin (Srinivas)   1 Term 06 39w3d  3.629 kg (8 lb) M CS   WADE      Birth Comments: arrest of dilation, ROT position, non-reassuring FHT      Name: Deniz De La Pazn     Past Medical History:   Diagnosis Date    LSIL (low grade squamous intraepithelial lesion) on Pap smear     colp- focal koilocytosis    NO ACTIVE PROBLEMS     Shingles 2015     Past Surgical History:   Procedure Laterality Date    C/SECTION, LOW TRANSVERSE  ,     , Low Transverse    HC TOOTH EXTRACTION W/FORCEP      wisdom teeth    LAPAROSCOPIC APPENDECTOMY N/A 10/3/2020    Procedure: APPENDECTOMY, LAPAROSCOPIC;  Surgeon: Toni Linton MD;  Location: [facility] OR    TUBAL LIGATION      PPTL     Family History   Problem Relation Age of Onset    Circulatory Mother         blood clots after SAB    Gastrointestinal Disease Mother         polyps removed    Thyroid Disease Mother         hypothyroid    Hypertension Mother     Macular Degeneration Mother     Breast Cancer Mother     Hypertension Father     Respiratory Brother         asthma    Arthritis Maternal Grandmother     Osteoporosis Maternal Grandmother     Hypertension Maternal Grandfather     Eye Disorder Maternal Grandfather         glacoma    Heart Disease Maternal Grandfather         MI    Hypertension Paternal Grandmother     Diabetes Paternal Grandmother     Cancer Paternal Grandfather         melanoma    Blood Disease Paternal Grandfather   "       hep     Social History     Socioeconomic History    Marital status:      Spouse name: Oni Benedict    Number of children: 2    Years of education: 19   Occupational History    Occupation: assistant prof     Comment: geography     Employer: [employer]   Tobacco Use    Smoking status: Never    Smokeless tobacco: Never   Substance and Sexual Activity    Alcohol use: Yes     Alcohol/week: 0.0 standard drinks of alcohol     Comment: occ    Drug use: No    Sexual activity: Not Currently     Partners: Male     Birth control/protection: Pill, Female Surgical     Comment: tubal ligation   Other Topics Concern    Parent/sibling w/ CABG, MI or angioplasty before 65F 55M? No   Social History Narrative            Current Outpatient Medications:     ibuprofen (ADVIL/MOTRIN) 600 MG tablet, Take 1 tablet (600 mg) by mouth every 6 hours as needed for moderate pain, Disp: 30 tablet, Rfl: 0    Multiple Vitamins-Minerals (MULTIVITAMIN ADULT PO), Take 1 tablet by mouth daily , Disp: , Rfl:     norgestimate-ethinyl estradiol (ORTHO-CYCLEN) 0.25-35 MG-MCG tablet, Take 1 tablet by mouth daily Active tablets daily for prevention of menses, Disp: 112 tablet, Rfl: 4     Allergies   Allergen Reactions    Pcn [Penicillins] Hives       Past medical, surgical, social and family history were reviewed and updated in [facility].      EXAM:  /65   Pulse 71   Ht 1.57 m (5' 1.81\")   Wt 65.3 kg (143 lb 14.4 oz)   LMP 10/23/2023   SpO2 100%   BMI 26.48 kg/m     BMI: Body mass index is 26.48 kg/m .  Constitutional: healthy, alert and no distress  Head: Normocephalic. No masses, lesions, tenderness or abnormalities  Neck: Neck supple. Trachea midline. No adenopathy. Thyroid symmetric, normal size.   Cardiovascular: RRR.   Respiratory: Negative.   Breast: Breasts reveal mild symmetric fibrocystic densities, but there are no dominant, discrete, fixed or suspicious masses found.  Gastrointestinal: Abdomen soft, non-tender, " non-distended. No masses, organomegaly.  :  Vulva:  No external lesions, normal female hair distribution, no inguinal adenopathy.    Urethra:  Midline, non-tender, well supported, no discharge  Vagina:  Moist, pink, no abnormal discharge, no lesions  Uterus:  Normal size , non-tender, freely mobile  Ovaries:  No masses appreciated, non-tender, mobile  Rectal Exam: deferred  Musculoskeletal: extremities normal  Skin: no suspicious lesions or rashes  Psychiatric: Affect appropriate, cooperative,mentation appears normal.     COUNSELING:   Reviewed preventive health counseling, as reflected in patient instructions   reports that she has never smoked. She has never used smokeless tobacco.    Body mass index is 26.48 kg/m .  Weight management plan: stable  FRAX Risk Assessment    ASSESSMENT:  53 year old female with satisfactory annual exam  (Z01.419) Encounter for gynecological examination without abnormal finding  (primary encounter diagnosis)  Comment: Tubal ligation  Plan: norgestimate-ethinyl estradiol (ORTHO-CYCLEN)         0.25-35 MG-MCG tablet        Refill of OCP was done.  Discussed not menopausal until having usually no bleeding on OCP and symptoms on week off.    (Z13.0) Screening, anemia, deficiency, iron  Plan: CBC with platelets        Check lab today    (Z13.220) Screening for lipid disorders  Comment: Fasting  Plan: Lipid panel reflex to direct LDL Fasting        Check lab today    (Z13.1) Screening for diabetes mellitus  Comment: Fasting  Plan: Comprehensive metabolic panel, Hemoglobin A1c        Check lab today    (Z13.29) Screening for thyroid disorder  Plan: TSH with free T4 reflex        Check lab today    (Z11.59) Need for hepatitis C screening test  Comment: Per [facility]  Plan: Hepatitis C antibody        Check lab today    Laura Philippe MD

## 2024-08-29 ENCOUNTER — ANCILLARY PROCEDURE (OUTPATIENT)
Dept: MAMMOGRAPHY | Facility: CLINIC | Age: 54
End: 2024-08-29
Payer: OTHER GOVERNMENT

## 2024-08-29 DIAGNOSIS — Z12.31 VISIT FOR SCREENING MAMMOGRAM: ICD-10-CM

## 2024-08-29 PROCEDURE — 77067 SCR MAMMO BI INCL CAD: CPT | Mod: TC | Performed by: RADIOLOGY

## 2024-08-29 PROCEDURE — 77063 BREAST TOMOSYNTHESIS BI: CPT | Mod: TC | Performed by: RADIOLOGY

## 2024-11-06 ENCOUNTER — OFFICE VISIT (OUTPATIENT)
Dept: OBGYN | Facility: CLINIC | Age: 54
End: 2024-11-06
Payer: OTHER GOVERNMENT

## 2024-11-06 VITALS
OXYGEN SATURATION: 99 % | HEART RATE: 77 BPM | WEIGHT: 145 LBS | HEIGHT: 62 IN | DIASTOLIC BLOOD PRESSURE: 60 MMHG | SYSTOLIC BLOOD PRESSURE: 108 MMHG | BODY MASS INDEX: 26.68 KG/M2

## 2024-11-06 DIAGNOSIS — Z01.419 ENCOUNTER FOR GYNECOLOGICAL EXAMINATION WITHOUT ABNORMAL FINDING: Primary | ICD-10-CM

## 2024-11-06 DIAGNOSIS — Z13.1 SCREENING FOR DIABETES MELLITUS: ICD-10-CM

## 2024-11-06 DIAGNOSIS — Z13.220 SCREENING FOR LIPID DISORDERS: ICD-10-CM

## 2024-11-06 DIAGNOSIS — Z13.0 SCREENING, ANEMIA, DEFICIENCY, IRON: ICD-10-CM

## 2024-11-06 DIAGNOSIS — Z13.29 SCREENING FOR THYROID DISORDER: ICD-10-CM

## 2024-11-06 LAB
ALBUMIN SERPL BCG-MCNC: 3.9 G/DL (ref 3.5–5.2)
ALP SERPL-CCNC: 80 U/L (ref 40–150)
ALT SERPL W P-5'-P-CCNC: 13 U/L (ref 0–50)
ANION GAP SERPL CALCULATED.3IONS-SCNC: 12 MMOL/L (ref 7–15)
AST SERPL W P-5'-P-CCNC: 18 U/L (ref 0–45)
BILIRUB SERPL-MCNC: 0.3 MG/DL
BUN SERPL-MCNC: 10.9 MG/DL (ref 6–20)
CALCIUM SERPL-MCNC: 9 MG/DL (ref 8.8–10.4)
CHLORIDE SERPL-SCNC: 103 MMOL/L (ref 98–107)
CHOLEST SERPL-MCNC: 212 MG/DL
CREAT SERPL-MCNC: 0.71 MG/DL (ref 0.51–0.95)
EGFRCR SERPLBLD CKD-EPI 2021: >90 ML/MIN/1.73M2
ERYTHROCYTE [DISTWIDTH] IN BLOOD BY AUTOMATED COUNT: 13.4 % (ref 10–15)
EST. AVERAGE GLUCOSE BLD GHB EST-MCNC: 103 MG/DL
FASTING STATUS PATIENT QL REPORTED: YES
FASTING STATUS PATIENT QL REPORTED: YES
GLUCOSE SERPL-MCNC: 87 MG/DL (ref 70–99)
HBA1C MFR BLD: 5.2 % (ref 0–5.6)
HCO3 SERPL-SCNC: 22 MMOL/L (ref 22–29)
HCT VFR BLD AUTO: 41.9 % (ref 35–47)
HDLC SERPL-MCNC: 76 MG/DL
HGB BLD-MCNC: 13.3 G/DL (ref 11.7–15.7)
LDLC SERPL CALC-MCNC: 110 MG/DL
MCH RBC QN AUTO: 30.2 PG (ref 26.5–33)
MCHC RBC AUTO-ENTMCNC: 31.7 G/DL (ref 31.5–36.5)
MCV RBC AUTO: 95 FL (ref 78–100)
NONHDLC SERPL-MCNC: 136 MG/DL
PLATELET # BLD AUTO: 305 10E3/UL (ref 150–450)
POTASSIUM SERPL-SCNC: 4.1 MMOL/L (ref 3.4–5.3)
PROT SERPL-MCNC: 6.7 G/DL (ref 6.4–8.3)
RBC # BLD AUTO: 4.4 10E6/UL (ref 3.8–5.2)
SODIUM SERPL-SCNC: 137 MMOL/L (ref 135–145)
TRIGL SERPL-MCNC: 129 MG/DL
TSH SERPL DL<=0.005 MIU/L-ACNC: 2.89 UIU/ML (ref 0.3–4.2)
WBC # BLD AUTO: 7.6 10E3/UL (ref 4–11)

## 2024-11-06 PROCEDURE — 83036 HEMOGLOBIN GLYCOSYLATED A1C: CPT | Performed by: OBSTETRICS & GYNECOLOGY

## 2024-11-06 PROCEDURE — 36415 COLL VENOUS BLD VENIPUNCTURE: CPT | Performed by: OBSTETRICS & GYNECOLOGY

## 2024-11-06 PROCEDURE — 80053 COMPREHEN METABOLIC PANEL: CPT | Performed by: OBSTETRICS & GYNECOLOGY

## 2024-11-06 PROCEDURE — 84443 ASSAY THYROID STIM HORMONE: CPT | Performed by: OBSTETRICS & GYNECOLOGY

## 2024-11-06 PROCEDURE — 85027 COMPLETE CBC AUTOMATED: CPT | Performed by: OBSTETRICS & GYNECOLOGY

## 2024-11-06 PROCEDURE — 99396 PREV VISIT EST AGE 40-64: CPT | Performed by: OBSTETRICS & GYNECOLOGY

## 2024-11-06 PROCEDURE — 80061 LIPID PANEL: CPT | Performed by: OBSTETRICS & GYNECOLOGY

## 2024-11-06 RX ORDER — NORGESTIMATE AND ETHINYL ESTRADIOL 0.25-0.035
1 KIT ORAL DAILY
Qty: 112 TABLET | Refills: 4 | Status: SHIPPED | OUTPATIENT
Start: 2024-11-06

## 2024-11-06 NOTE — PROGRESS NOTES
Maria Teresa is a 54 year old  female who presents for annual exam.     Menses are regular q 90 days and normal lasting  4  days.  Menses flow: normal.  No LMP recorded.. Using tubal ligation for contraception.  She is not currently considering pregnancy.  Besides routine health maintenance, she has no other health concerns today .  Son just started college at [employer]  and other son is 15 y/o.  Able to go 7-8 weeks still but then BTB on OCP.  Knows not in menopause yet.   GYNECOLOGIC HISTORY:  Menarche:   Maria Teresa is not sexually active   History sexually transmitted infections:No STD history  STI testing offered?  Declined  LAURA exposure: Unknown  History of abnormal Pap smear: No - age 30- 64 PAP with HPV every 5 years recommended  Family history of breast CA: Yes (Please explain): mom  Family history of uterine/ovarian CA: No    Family history of colon CA: No    HEALTH MAINTENANCE:  Cholesterol: (  Cholesterol   Date Value Ref Range Status   2023 210 (H) <200 mg/dL Final   10/12/2021 192 <200 mg/dL Final   2018 189 <200 mg/dL Final   2015 167 <200 mg/dL Final     Comment:     LDL Cholesterol is the primary guide to therapy.   The NCEP recommends further evaluation of: patients with cholesterol greater   than 200 mg/dL if additional risk factors are present, cholesterol greater   than   240 mg/dL, triglycerides greater than 150 mg/dL, or HDL less than 40 mg/dL.      History of abnormal lipids: Yes  Mammo: 24 . History of abnormal Mammo: No.  Regular Self Breast Exams: Yes  Calcium/Vitamin D intake: source:  dairy, dietary supplement(s) Adequate? Yes  TSH: (  TSH   Date Value Ref Range Status   2023 3.03 0.30 - 4.20 uIU/mL Final   10/12/2021 2.27 0.40 - 4.00 mU/L Final   2018 1.80 0.40 - 4.00 mU/L Final    )  Pap; (  Lab Results   Component Value Date    PAP NIL 2020    PAP NIL 07/10/2015    PAP NIL 2012    )    HISTORY:  OB History    Para Term  AB Living    2 2 2 0 0 2   SAB IAB Ectopic Multiple Live Births   0 0 0 0 2      # Outcome Date GA Lbr Tu/2nd Weight Sex Type Anes PTL Lv   2 Term 09 38w3d  3.572 kg (7 lb 14 oz) M CS-Unspec   WADE      Birth Comments: repeat in labor, PPTL      Name: Darin (Srinivas)   1 Term 06 39w3d  3.629 kg (8 lb) M CS   WADE      Birth Comments: arrest of dilation, ROT position, non-reassuring FHT      Name: Deniz Chacon     Past Medical History:   Diagnosis Date    LSIL (low grade squamous intraepithelial lesion) on Pap smear     colp- focal koilocytosis    NO ACTIVE PROBLEMS     Shingles 2015     Past Surgical History:   Procedure Laterality Date    C/SECTION, LOW TRANSVERSE  ,     , Low Transverse    HC TOOTH EXTRACTION W/FORCEP      wisdom teeth    LAPAROSCOPIC APPENDECTOMY N/A 10/3/2020    Procedure: APPENDECTOMY, LAPAROSCOPIC;  Surgeon: Toni Linton MD;  Location: [facility] OR    TUBAL LIGATION      PPTL     Family History   Problem Relation Age of Onset    Circulatory Mother         blood clots after SAB    Gastrointestinal Disease Mother         polyps removed    Thyroid Disease Mother         hypothyroid    Hypertension Mother     Macular Degeneration Mother     Breast Cancer Mother     Hypertension Father     Respiratory Brother         asthma    Arthritis Maternal Grandmother     Osteoporosis Maternal Grandmother     Hypertension Maternal Grandfather     Eye Disorder Maternal Grandfather         glacoma    Heart Disease Maternal Grandfather         MI    Hypertension Paternal Grandmother     Diabetes Paternal Grandmother     Cancer Paternal Grandfather         melanoma    Blood Disease Paternal Grandfather         hep     Social History     Socioeconomic History    Marital status:      Spouse name: Oni Benedict    Number of children: 2    Years of education: 19   Occupational History    Occupation: assistant prof     Comment: geography     Employer: [employer]   Tobacco Use     "Smoking status: Never    Smokeless tobacco: Never   Substance and Sexual Activity    Alcohol use: Yes     Alcohol/week: 0.0 standard drinks of alcohol     Comment: occ    Drug use: No    Sexual activity: Yes     Partners: Male     Birth control/protection: Pill, Female Surgical     Comment: tubal ligation   Other Topics Concern    Parent/sibling w/ CABG, MI or angioplasty before 65F 55M? No   Social History Narrative            Current Outpatient Medications:     ibuprofen (ADVIL/MOTRIN) 600 MG tablet, Take 1 tablet (600 mg) by mouth every 6 hours as needed for moderate pain, Disp: 30 tablet, Rfl: 0    Multiple Vitamins-Minerals (MULTIVITAMIN ADULT PO), Take 1 tablet by mouth daily , Disp: , Rfl:     norgestimate-ethinyl estradiol (ORTHO-CYCLEN) 0.25-35 MG-MCG tablet, Take 1 tablet by mouth daily Active tablets daily for prevention of menses, Disp: 112 tablet, Rfl: 4     Allergies   Allergen Reactions    Pcn [Penicillins] Hives       Past medical, surgical, social and family history were reviewed and updated in [facility].    EXAM:  /60   Pulse 77   Ht 1.57 m (5' 1.81\")   Wt 65.8 kg (145 lb)   SpO2 99%   BMI 26.68 kg/m     BMI: Body mass index is 26.68 kg/m .  Constitutional: healthy, alert and no distress  Head: Normocephalic. No masses, lesions, tenderness or abnormalities  Neck: Neck supple. Trachea midline. No adenopathy. Thyroid symmetric, normal size.   Cardiovascular: RRR.   Respiratory: Negative.   Breast: Breasts reveal mild symmetric fibrocystic densities, but there are no dominant, discrete, fixed or suspicious masses found.  Gastrointestinal: Abdomen soft, non-tender, non-distended. No masses, organomegaly.  : deferred  Musculoskeletal: extremities normal  Skin: no suspicious lesions or rashes  Psychiatric: Affect appropriate, cooperative,mentation appears normal.     COUNSELING:   Reviewed preventive health counseling, as reflected in patient instructions       Contraception   reports that she " has never smoked. She has never used smokeless tobacco.    Body mass index is 26.68 kg/m .  Weight management plan: not discussed  FRAX Risk Assessment    ASSESSMENT:  54 year old female with satisfactory annual exam  (Z01.419) Encounter for gynecological examination without abnormal finding  (primary encounter diagnosis)  Comment: Tubal ligation  Plan: norgestimate-ethinyl estradiol (ORTHO-CYCLEN)         0.25-35 MG-MCG tablet        Continue with continuous OCP until age 57 then can switch to HRT.    (Z13.0) Screening, anemia, deficiency, iron  Plan: CBC with platelets        Check lab today    (Z13.220) Screening for lipid disorders  Comment: Fasting  Plan: Lipid panel reflex to direct LDL Fasting        Check lab today    (Z13.1) Screening for diabetes mellitus  Comment: Fasting  Plan: Comprehensive metabolic panel, Hemoglobin A1c        Check lab today    (Z13.29) Screening for thyroid disorder  Comment: Borderline last year  Plan: TSH with free T4 reflex        Check lab today    Laura Philippe MD

## 2025-09-02 ENCOUNTER — ANCILLARY PROCEDURE (OUTPATIENT)
Dept: MAMMOGRAPHY | Facility: CLINIC | Age: 55
End: 2025-09-02
Payer: COMMERCIAL

## 2025-09-02 DIAGNOSIS — Z12.31 VISIT FOR SCREENING MAMMOGRAM: ICD-10-CM

## 2025-09-02 PROCEDURE — 77063 BREAST TOMOSYNTHESIS BI: CPT | Mod: TC | Performed by: RADIOLOGY

## 2025-09-02 PROCEDURE — 77067 SCR MAMMO BI INCL CAD: CPT | Mod: TC | Performed by: RADIOLOGY

## (undated) DEVICE — Device

## (undated) DEVICE — GLOVE GAMMEX DERMAPRENE ULTRA SZ 8.5 LF 8517

## (undated) DEVICE — SUCTION IRR STRYKERFLOW II W/TIP 250-070-520

## (undated) DEVICE — SU VICRYL 0 UR-6 27" J603H

## (undated) DEVICE — ENDO TROCAR FIRST ENTRY KII FIOS Z-THRD 12X100MM CTF73

## (undated) DEVICE — ENDO SCOPE WARMER LF TM500

## (undated) DEVICE — PREP CHLORAPREP 26ML TINTED ORANGE  260815

## (undated) DEVICE — LINEN TOWEL PACK X5 5464

## (undated) DEVICE — ENDO TROCAR FIRST ENTRY KII FIOS Z-THRD 05X100MM CTF03

## (undated) DEVICE — EVAC SYSTEM CLEAR FLOW SC082500

## (undated) DEVICE — SU VICRYL 4-0 PS-2 18" UND J496H

## (undated) DEVICE — ESU GROUND PAD UNIVERSAL W/O CORD

## (undated) DEVICE — SOL WATER IRRIG 1000ML BOTTLE 2F7114

## (undated) DEVICE — ESU HOLDER LAP INST DISP PURPLE LONG 330MM H-PRO-330

## (undated) DEVICE — SOL NACL 0.9% INJ 1000ML BAG 2B1324X

## (undated) DEVICE — PACK LAP CHOLE SLC15LCFSD

## (undated) DEVICE — STPL RELOAD REG TISSUE ECHELON 45 X 3.6MM BLUE GST45B

## (undated) DEVICE — STPL POWERED ECHELON 45MM PSEE45A

## (undated) DEVICE — ENDO TROCAR SLEEVE KII Z-THREADED 05X100MM CTS02

## (undated) DEVICE — ENDO POUCH UNIV RETRIEVAL SYSTEM INZII 10MM CD001

## (undated) RX ORDER — HYDROMORPHONE HYDROCHLORIDE 1 MG/ML
INJECTION, SOLUTION INTRAMUSCULAR; INTRAVENOUS; SUBCUTANEOUS
Status: DISPENSED
Start: 2020-10-03

## (undated) RX ORDER — DEXAMETHASONE SODIUM PHOSPHATE 4 MG/ML
INJECTION, SOLUTION INTRA-ARTICULAR; INTRALESIONAL; INTRAMUSCULAR; INTRAVENOUS; SOFT TISSUE
Status: DISPENSED
Start: 2020-10-03

## (undated) RX ORDER — BUPIVACAINE HYDROCHLORIDE AND EPINEPHRINE 2.5; 5 MG/ML; UG/ML
INJECTION, SOLUTION EPIDURAL; INFILTRATION; INTRACAUDAL; PERINEURAL
Status: DISPENSED
Start: 2020-10-03

## (undated) RX ORDER — LIDOCAINE HYDROCHLORIDE 20 MG/ML
INJECTION, SOLUTION EPIDURAL; INFILTRATION; INTRACAUDAL; PERINEURAL
Status: DISPENSED
Start: 2020-10-03

## (undated) RX ORDER — KETOROLAC TROMETHAMINE 30 MG/ML
INJECTION, SOLUTION INTRAMUSCULAR; INTRAVENOUS
Status: DISPENSED
Start: 2020-10-03

## (undated) RX ORDER — FENTANYL CITRATE 50 UG/ML
INJECTION, SOLUTION INTRAMUSCULAR; INTRAVENOUS
Status: DISPENSED
Start: 2020-10-03

## (undated) RX ORDER — ONDANSETRON 2 MG/ML
INJECTION INTRAMUSCULAR; INTRAVENOUS
Status: DISPENSED
Start: 2020-10-03

## (undated) RX ORDER — HYDROCODONE BITARTRATE AND ACETAMINOPHEN 5; 325 MG/1; MG/1
TABLET ORAL
Status: DISPENSED
Start: 2020-10-03

## (undated) RX ORDER — PROPOFOL 10 MG/ML
INJECTION, EMULSION INTRAVENOUS
Status: DISPENSED
Start: 2020-10-03